# Patient Record
Sex: MALE | Race: BLACK OR AFRICAN AMERICAN | NOT HISPANIC OR LATINO | URBAN - METROPOLITAN AREA
[De-identification: names, ages, dates, MRNs, and addresses within clinical notes are randomized per-mention and may not be internally consistent; named-entity substitution may affect disease eponyms.]

---

## 2023-04-23 ENCOUNTER — INPATIENT (INPATIENT)
Facility: HOSPITAL | Age: 38
LOS: 23 days | Discharge: HOME | End: 2023-05-17
Attending: PSYCHIATRY & NEUROLOGY | Admitting: PSYCHIATRY & NEUROLOGY
Payer: MEDICAID

## 2023-04-23 VITALS
DIASTOLIC BLOOD PRESSURE: 80 MMHG | HEART RATE: 78 BPM | TEMPERATURE: 98 F | RESPIRATION RATE: 18 BRPM | SYSTOLIC BLOOD PRESSURE: 130 MMHG | OXYGEN SATURATION: 100 %

## 2023-04-23 DIAGNOSIS — F25.0 SCHIZOAFFECTIVE DISORDER, BIPOLAR TYPE: ICD-10-CM

## 2023-04-23 DIAGNOSIS — F12.10 CANNABIS ABUSE, UNCOMPLICATED: ICD-10-CM

## 2023-04-23 LAB
ALBUMIN SERPL ELPH-MCNC: 4.6 G/DL — SIGNIFICANT CHANGE UP (ref 3.3–5)
ALP SERPL-CCNC: 57 U/L — SIGNIFICANT CHANGE UP (ref 40–120)
ALT FLD-CCNC: 31 U/L — SIGNIFICANT CHANGE UP (ref 4–41)
AMPHET UR-MCNC: NEGATIVE — SIGNIFICANT CHANGE UP
ANION GAP SERPL CALC-SCNC: 11 MMOL/L — SIGNIFICANT CHANGE UP (ref 7–14)
APAP SERPL-MCNC: <10 UG/ML — LOW (ref 15–25)
APPEARANCE UR: CLEAR — SIGNIFICANT CHANGE UP
AST SERPL-CCNC: 17 U/L — SIGNIFICANT CHANGE UP (ref 4–40)
B PERT DNA SPEC QL NAA+PROBE: SIGNIFICANT CHANGE UP
B PERT+PARAPERT DNA PNL SPEC NAA+PROBE: SIGNIFICANT CHANGE UP
BARBITURATES UR SCN-MCNC: NEGATIVE — SIGNIFICANT CHANGE UP
BASOPHILS # BLD AUTO: 0.04 K/UL — SIGNIFICANT CHANGE UP (ref 0–0.2)
BASOPHILS NFR BLD AUTO: 0.8 % — SIGNIFICANT CHANGE UP (ref 0–2)
BENZODIAZ UR-MCNC: NEGATIVE — SIGNIFICANT CHANGE UP
BILIRUB SERPL-MCNC: 0.9 MG/DL — SIGNIFICANT CHANGE UP (ref 0.2–1.2)
BILIRUB UR-MCNC: NEGATIVE — SIGNIFICANT CHANGE UP
BORDETELLA PARAPERTUSSIS (RAPRVP): SIGNIFICANT CHANGE UP
BUN SERPL-MCNC: 11 MG/DL — SIGNIFICANT CHANGE UP (ref 7–23)
C PNEUM DNA SPEC QL NAA+PROBE: SIGNIFICANT CHANGE UP
CALCIUM SERPL-MCNC: 8.9 MG/DL — SIGNIFICANT CHANGE UP (ref 8.4–10.5)
CHLORIDE SERPL-SCNC: 103 MMOL/L — SIGNIFICANT CHANGE UP (ref 98–107)
CO2 SERPL-SCNC: 25 MMOL/L — SIGNIFICANT CHANGE UP (ref 22–31)
COCAINE METAB.OTHER UR-MCNC: NEGATIVE — SIGNIFICANT CHANGE UP
COLOR SPEC: COLORLESS — SIGNIFICANT CHANGE UP
CREAT SERPL-MCNC: 0.82 MG/DL — SIGNIFICANT CHANGE UP (ref 0.5–1.3)
CREATININE URINE RESULT, DAU: 23 MG/DL — SIGNIFICANT CHANGE UP
DIFF PNL FLD: NEGATIVE — SIGNIFICANT CHANGE UP
EGFR: 117 ML/MIN/1.73M2 — SIGNIFICANT CHANGE UP
EOSINOPHIL # BLD AUTO: 0.04 K/UL — SIGNIFICANT CHANGE UP (ref 0–0.5)
EOSINOPHIL NFR BLD AUTO: 0.8 % — SIGNIFICANT CHANGE UP (ref 0–6)
ETHANOL SERPL-MCNC: <10 MG/DL — SIGNIFICANT CHANGE UP
FLUAV SUBTYP SPEC NAA+PROBE: SIGNIFICANT CHANGE UP
FLUBV RNA SPEC QL NAA+PROBE: SIGNIFICANT CHANGE UP
GLUCOSE SERPL-MCNC: 99 MG/DL — SIGNIFICANT CHANGE UP (ref 70–99)
GLUCOSE UR QL: NEGATIVE — SIGNIFICANT CHANGE UP
HADV DNA SPEC QL NAA+PROBE: SIGNIFICANT CHANGE UP
HCOV 229E RNA SPEC QL NAA+PROBE: SIGNIFICANT CHANGE UP
HCOV HKU1 RNA SPEC QL NAA+PROBE: DETECTED
HCOV NL63 RNA SPEC QL NAA+PROBE: SIGNIFICANT CHANGE UP
HCOV OC43 RNA SPEC QL NAA+PROBE: SIGNIFICANT CHANGE UP
HCT VFR BLD CALC: 37.1 % — LOW (ref 39–50)
HGB BLD-MCNC: 12.1 G/DL — LOW (ref 13–17)
HMPV RNA SPEC QL NAA+PROBE: SIGNIFICANT CHANGE UP
HPIV1 RNA SPEC QL NAA+PROBE: SIGNIFICANT CHANGE UP
HPIV2 RNA SPEC QL NAA+PROBE: SIGNIFICANT CHANGE UP
HPIV3 RNA SPEC QL NAA+PROBE: SIGNIFICANT CHANGE UP
HPIV4 RNA SPEC QL NAA+PROBE: SIGNIFICANT CHANGE UP
IANC: 2.87 K/UL — SIGNIFICANT CHANGE UP (ref 1.8–7.4)
IMM GRANULOCYTES NFR BLD AUTO: 0 % — SIGNIFICANT CHANGE UP (ref 0–0.9)
KETONES UR-MCNC: NEGATIVE — SIGNIFICANT CHANGE UP
LEUKOCYTE ESTERASE UR-ACNC: NEGATIVE — SIGNIFICANT CHANGE UP
LYMPHOCYTES # BLD AUTO: 1.37 K/UL — SIGNIFICANT CHANGE UP (ref 1–3.3)
LYMPHOCYTES # BLD AUTO: 27.6 % — SIGNIFICANT CHANGE UP (ref 13–44)
M PNEUMO DNA SPEC QL NAA+PROBE: SIGNIFICANT CHANGE UP
MCHC RBC-ENTMCNC: 31.5 PG — SIGNIFICANT CHANGE UP (ref 27–34)
MCHC RBC-ENTMCNC: 32.6 GM/DL — SIGNIFICANT CHANGE UP (ref 32–36)
MCV RBC AUTO: 96.6 FL — SIGNIFICANT CHANGE UP (ref 80–100)
METHADONE UR-MCNC: NEGATIVE — SIGNIFICANT CHANGE UP
MONOCYTES # BLD AUTO: 0.65 K/UL — SIGNIFICANT CHANGE UP (ref 0–0.9)
MONOCYTES NFR BLD AUTO: 13.1 % — SIGNIFICANT CHANGE UP (ref 2–14)
NEUTROPHILS # BLD AUTO: 2.87 K/UL — SIGNIFICANT CHANGE UP (ref 1.8–7.4)
NEUTROPHILS NFR BLD AUTO: 57.7 % — SIGNIFICANT CHANGE UP (ref 43–77)
NITRITE UR-MCNC: NEGATIVE — SIGNIFICANT CHANGE UP
NRBC # BLD: 0 /100 WBCS — SIGNIFICANT CHANGE UP (ref 0–0)
NRBC # FLD: 0 K/UL — SIGNIFICANT CHANGE UP (ref 0–0)
OPIATES UR-MCNC: NEGATIVE — SIGNIFICANT CHANGE UP
OXYCODONE UR-MCNC: NEGATIVE — SIGNIFICANT CHANGE UP
PCP SPEC-MCNC: SIGNIFICANT CHANGE UP
PCP UR-MCNC: NEGATIVE — SIGNIFICANT CHANGE UP
PH UR: 7 — SIGNIFICANT CHANGE UP (ref 5–8)
PLATELET # BLD AUTO: 261 K/UL — SIGNIFICANT CHANGE UP (ref 150–400)
POTASSIUM SERPL-MCNC: 3.8 MMOL/L — SIGNIFICANT CHANGE UP (ref 3.5–5.3)
POTASSIUM SERPL-SCNC: 3.8 MMOL/L — SIGNIFICANT CHANGE UP (ref 3.5–5.3)
PROT SERPL-MCNC: 7.2 G/DL — SIGNIFICANT CHANGE UP (ref 6–8.3)
PROT UR-MCNC: NEGATIVE — SIGNIFICANT CHANGE UP
RAPID RVP RESULT: DETECTED
RBC # BLD: 3.84 M/UL — LOW (ref 4.2–5.8)
RBC # FLD: 12.7 % — SIGNIFICANT CHANGE UP (ref 10.3–14.5)
RSV RNA SPEC QL NAA+PROBE: SIGNIFICANT CHANGE UP
RV+EV RNA SPEC QL NAA+PROBE: SIGNIFICANT CHANGE UP
SALICYLATES SERPL-MCNC: <0.3 MG/DL — LOW (ref 15–30)
SARS-COV-2 RNA SPEC QL NAA+PROBE: SIGNIFICANT CHANGE UP
SODIUM SERPL-SCNC: 139 MMOL/L — SIGNIFICANT CHANGE UP (ref 135–145)
SP GR SPEC: 1.01 — LOW (ref 1.01–1.05)
THC UR QL: POSITIVE
TOXICOLOGY SCREEN, DRUGS OF ABUSE, SERUM RESULT: SIGNIFICANT CHANGE UP
TSH SERPL-MCNC: 1.6 UIU/ML — SIGNIFICANT CHANGE UP (ref 0.27–4.2)
UROBILINOGEN FLD QL: SIGNIFICANT CHANGE UP
WBC # BLD: 4.97 K/UL — SIGNIFICANT CHANGE UP (ref 3.8–10.5)
WBC # FLD AUTO: 4.97 K/UL — SIGNIFICANT CHANGE UP (ref 3.8–10.5)

## 2023-04-23 PROCEDURE — 99285 EMERGENCY DEPT VISIT HI MDM: CPT

## 2023-04-23 RX ORDER — RISPERIDONE 4 MG/1
2 TABLET ORAL AT BEDTIME
Refills: 0 | Status: DISCONTINUED | OUTPATIENT
Start: 2023-04-24 | End: 2023-05-01

## 2023-04-23 RX ORDER — HALOPERIDOL DECANOATE 100 MG/ML
5 INJECTION INTRAMUSCULAR ONCE
Refills: 0 | Status: DISCONTINUED | OUTPATIENT
Start: 2023-04-24 | End: 2023-05-17

## 2023-04-23 RX ORDER — RISPERIDONE 4 MG/1
2 TABLET ORAL AT BEDTIME
Refills: 0 | Status: DISCONTINUED | OUTPATIENT
Start: 2023-04-23 | End: 2023-04-24

## 2023-04-23 RX ORDER — HALOPERIDOL DECANOATE 100 MG/ML
5 INJECTION INTRAMUSCULAR ONCE
Refills: 0 | Status: COMPLETED | OUTPATIENT
Start: 2023-04-23 | End: 2023-04-23

## 2023-04-23 RX ORDER — HALOPERIDOL DECANOATE 100 MG/ML
5 INJECTION INTRAMUSCULAR EVERY 6 HOURS
Refills: 0 | Status: DISCONTINUED | OUTPATIENT
Start: 2023-04-24 | End: 2023-05-17

## 2023-04-23 RX ORDER — DIVALPROEX SODIUM 500 MG/1
250 TABLET, DELAYED RELEASE ORAL
Refills: 0 | Status: DISCONTINUED | OUTPATIENT
Start: 2023-04-23 | End: 2023-04-24

## 2023-04-23 RX ADMIN — HALOPERIDOL DECANOATE 5 MILLIGRAM(S): 100 INJECTION INTRAMUSCULAR at 14:59

## 2023-04-23 RX ADMIN — RISPERIDONE 2 MILLIGRAM(S): 4 TABLET ORAL at 22:09

## 2023-04-23 RX ADMIN — DIVALPROEX SODIUM 250 MILLIGRAM(S): 500 TABLET, DELAYED RELEASE ORAL at 18:50

## 2023-04-23 RX ADMIN — Medication 2 MILLIGRAM(S): at 14:59
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
## 2023-04-23 NOTE — ED BEHAVIORAL HEALTH NOTE - BEHAVIORAL HEALTH NOTE
COVID Exposure Screen- collateral (i.e. third-party, chart review, belongings, etc; include EMS and ED staff)  1.	*Has the patient had a COVID-19 test in the last 90 days?  (  x) Yes   (  ) No   (  ) Unknown- Reason: _____  IF YES PROCEED TO QUESTION #2. IF NO OR UNKNOWN, PLEASE SKIP TO QUESTION #3.  2.	Date of test(s) and result(s): _2 weeks ago was admitted to [facility] _______  3.	*Has the patient tested positive for COVID-19 antibodies? (  ) Yes   (  ) No   (  x) Unknown- Reason: _____  IF YES PROCEED TO QUESTION #4. IF NO or UNKNOWN, PLEASE SKIP TO QUESTION #5.  4.	Date of positive antibody test: ________  5.	*Has the patient received 2 doses of the COVID-19 vaccine? (  ) Yes   ( x ) No   (  ) Unknown- Reason: _____  IF YES PROCEED TO QUESTION #6. IF NO or UNKNOWN, PLEASE SKIP TO QUESTION #7.  6.	 Date of second dose: ________  7.	*In the past 10 days, has the patient been around anyone with a positive COVID-19 test?* (  ) Yes   (  x) No   (  ) Unknown- Reason: __  IF YES PROCEED TO QUESTION #8. IF NO or UNKNOWN, PLEASE SKIP TO QUESTION #13.  8.	Was the patient within 6 feet of them for at least 15 minutes? (  ) Yes   (  ) No   (  ) Unknown- Reason: _____  9.	Did the patient provide care for them? (  ) Yes   (  ) No   (  ) Unknown- Reason: ______  10.	Did the patient have direct physical contact with them (touched, hugged, or kissed them)? (  ) Yes   (  ) No    (  ) Unknown- Reason: __  11.	Did the patient share eating or drinking utensils with them? (  ) Yes   (  ) No    (  ) Unknown- Reason: ____  12.	Did they sneeze, cough, or somehow get respiratory droplets on the patient? (  ) Yes   (  ) No    (  ) Unknown- Reason: ______  13.	*Has the patient been out of [place] within the past 10 days?* (  ) Yes   ( x ) No   (  ) Unknown- Reason: _____  IF YES PLEASE ANSWER THE FOLLOWING QUESTIONS:  14.	Which state/country did they go to? ______  15.	Were they there over 24 hours? (  ) Yes   (  ) No    (  ) Unknown- Reason: ______  16.	Date of return to [facility]: ______

## 2023-04-23 NOTE — ED BEHAVIORAL HEALTH ASSESSMENT NOTE - NSBHATTESTAPPAMEND_PSY_A_CORE
I have personally seen and examined this patient. I fully participated in the care of this patient. I have made amendments to the documentation where appropriate and otherwise agree with the history, physical exam, and plan as documented by the JOSE
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
## 2023-04-23 NOTE — ED BEHAVIORAL HEALTH ASSESSMENT NOTE - DESCRIPTION
seasonal allergies hyperverbal with poor boundaries   ICU Vital Signs Last 24 Hrs  T(C): 36.7 (23 Apr 2023 12:49), Max: 36.9 (23 Apr 2023 11:03)  T(F): 98.1 (23 Apr 2023 12:49), Max: 98.4 (23 Apr 2023 11:03)  HR: 70 (23 Apr 2023 12:49) (70 - 78)  BP: 148/67 (23 Apr 2023 12:49) (130/80 - 148/67)  BP(mean): --  ABP: --  ABP(mean): --  RR: 18 (23 Apr 2023 12:49) (18 - 18)  SpO2: 100% (23 Apr 2023 12:49) (100% - 100%)    O2 Parameters below as of 23 Apr 2023 12:49  Patient On (Oxygen Delivery Method): room air was living with parents, [id number], single,

## 2023-04-23 NOTE — ED BEHAVIORAL HEALTH ASSESSMENT NOTE - RISK ASSESSMENT
high risk for aggression- psychosis, h/o aggression, compliant with treatment, substance abuse   low risk for suicide- no known suicide attempts, denies suicidal ideation, futuristic,

## 2023-04-23 NOTE — ED BEHAVIORAL HEALTH ASSESSMENT NOTE - PSYCHIATRIC ISSUES AND PLAN (INCLUDE STANDING AND PRN MEDICATION)
Start Risperdal 1 mg hs, Ativan 2 mg Haldol 5 mg po/im q6h prn agitation Start Risperdal 2 mg hs, Ativan 2 mg Haldol 5 mg po/im q6h prn agitation

## 2023-04-23 NOTE — ED BEHAVIORAL HEALTH ASSESSMENT NOTE - NSBHATTESTCOMMENTATTENDFT_PSY_A_CORE
Patient also seen by Attending: start risperdal 2mg PO bid and depakote 250mg PO bid for affective dysregulation, lability and impulsivity. Urine + for THC - daily THC use likely a factor in presentation but Marijuana use by itself (even with intoxication) cannot fully explain trajectory of symptoms, clinical presentation and collateral. Agree with above plan.
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
## 2023-04-23 NOTE — ED ADULT NURSE NOTE - OBJECTIVE STATEMENT
Received pt in  pt hyperverbal bought in for erratic behavior pt denies si/hi/avh presently, labs/covid collected eval on going.
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
## 2023-04-23 NOTE — ED BEHAVIORAL HEALTH ASSESSMENT NOTE - NSBHATTESTTYPEVISIT_PSY_A_CORE
On-site Attending with Resident/Fellow/Student and JOSE (99XXX codes) Attending evaluating patient with JOSE ([id number] code)
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
## 2023-04-23 NOTE — ED PROVIDER NOTE - PROGRESS NOTE DETAILS
ERIC Stanley: Pt received at sign out pending bed assignment; pt continuing to endorse neck pain requesting medications, appears agitated, Haldol and Ativan ordered.

## 2023-04-24 DIAGNOSIS — F25.0 SCHIZOAFFECTIVE DISORDER, BIPOLAR TYPE: ICD-10-CM

## 2023-04-24 PROCEDURE — 99214 OFFICE O/P EST MOD 30 MIN: CPT | Mod: GC

## 2023-04-24 RX ORDER — DIVALPROEX SODIUM 500 MG/1
500 TABLET, DELAYED RELEASE ORAL AT BEDTIME
Refills: 0 | Status: DISCONTINUED | OUTPATIENT
Start: 2023-04-24 | End: 2023-04-28

## 2023-04-24 RX ORDER — HALOPERIDOL DECANOATE 100 MG/ML
5 INJECTION INTRAMUSCULAR ONCE
Refills: 0 | Status: COMPLETED | OUTPATIENT
Start: 2023-04-24 | End: 2023-04-24

## 2023-04-24 RX ADMIN — Medication 2 MILLIGRAM(S): at 10:20

## 2023-04-24 RX ADMIN — DIVALPROEX SODIUM 500 MILLIGRAM(S): 500 TABLET, DELAYED RELEASE ORAL at 20:46

## 2023-04-24 RX ADMIN — RISPERIDONE 2 MILLIGRAM(S): 4 TABLET ORAL at 20:46

## 2023-04-24 RX ADMIN — HALOPERIDOL DECANOATE 5 MILLIGRAM(S): 100 INJECTION INTRAMUSCULAR at 10:20

## 2023-04-24 RX ADMIN — DIVALPROEX SODIUM 250 MILLIGRAM(S): 500 TABLET, DELAYED RELEASE ORAL at 07:34

## 2023-04-24 RX ADMIN — Medication 2 MILLIGRAM(S): at 20:46
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
## 2023-04-24 NOTE — BH PATIENT PROFILE - NSICDXPASTMEDICALHX_GEN_ALL_CORE_FT
[facility]    [address] [place] [address]    Phone:  [phone]    Fax:  [phone]       Thank You for choosing us for your health care visit. We are glad to serve you and happy to provide you with this summary of your visit. Please help us to ensure we have accurate records. If you find anything that needs to be changed, please let our staff know as soon as possible.          Your Demographic Information     Patient Name Sex Sujata Lehman Female 1950       Ethnic Group Patient Race    Not of  or  Origin White      Your Visit Details     Date & Time Provider Department    3/1/2017 9:00 AM T[facility] Haas [facility]      We Ordered or Performed the Following     THINPREP PAP TEST WITH HPV REGARDLESS       Conditions Discussed Today or Order-Related Diagnoses        Comments    Well adult exam    -  Primary     Cervical cancer screening           Your Vitals Were     BP Pulse Temp Resp Height Weight    108/66 (BP Location: [facility] [place], Patient Position: Sitting, Cuff Size: Regular) 76 98.5 °F (36.9 °C) (Tympanic) 10 5' 2\" (1.575 m) 109 lb 3.2 oz (49.5 kg)    BMI Smoking Status                19.97 kg/m2 Former Smoker          Medications Prescribed or Re-Ordered Today     None      Your Current Medications Are        Disp Refills Start End    potassium citrate SR 10 MEQ (1080 MG) Tab  tablet 4 3/21/2016     Sig - Route: Take 1 tablet by mouth 3 times daily (with meals). - Oral    Cholecalciferol (VITAMIN D3) 23062 UNITS Cap 6 tablet 3 3/21/2016     Sig - Route: Take 50,000 Units by mouth every 14 days. - Oral    Class: Eprescribe    triamcinolone (ARISTOCORT) 0.1 % cream 80 g 1 10/1/2014     Sig: Apply twice daily to itchy rash of shins until smooth and no longer itchy    Class: Eprescribe    Cosign for Ordering: Accepted by Kim Alicea MD on 10/1/2014  6:05 PM    tretinoin  (RETIN-A) 0.025 % cream 45 g 6 10/1/2014     Sig: Apply thin layer to affected areas of the face each nigh as directed-for cosmetic purposes    Class: Eprescribe    Notes to Pharmacy: FOR COSMETIC PURPOSES-DO NOT RUN THROUGH INSURANCE    Cosign for Ordering: Accepted by Kim Alicea MD on 10/1/2014  6:05 PM    mercaptopurine (PURINETHOL) 50 MG tablet        Sig - Route: Take 50 mg by mouth daily. - Oral    Class: Historical Med    cyanocobalamin 1000 MCG/ML injection        Sig - Route: Inject 1,000 mcg into the muscle once. Monthly - Intramuscular    Class: Historical Med    Multiple Vitamins-Minerals (MULTI FOR HER PO)        Sig - Route: Take  by mouth. - Oral    Class: Historical Med    Cholestyramine POWD        Class: Historical Med    Route: Does not apply    folic acid (FOLATE) 1 MG tablet        Sig - Route: Take 1 mg by mouth daily. - Oral    Class: Historical Med      Discontinued Medications        Reason for Discontinue    Calcium Carbonate-Vitamin D (CALCIUM 600+D PO) Patient Declined      Allergies     Unable To Obtain [Unknown] Other (See Comments)    hayfever      Immunizations History as of 3/1/2017     Name Date    HERPES ZOSTER SHINGLES 4/1/2016, 4/1/2015    Hepatitis A - Adult 12/31/2010    Influenza 11/18/2016    Td:Adult type tetanus/diphtheria 1/27/2003    Tdap 12/31/2010    Typhoid, Intramuscular 12/31/2010      Patient Portal Signup     Manage health care for you and your family anytime, anywhere with the new [facility], your free online resource for quick and easy access to personal health information, scheduling appointments, refilling prescriptions, viewing test results, paying bills and more.  Sign up for a free and secure account. Please follow the instructions below to securely complete your enrollment.     1. Go to [email or web address]  2. Click Sign Up Now   3. Enter the Activation Code when prompted     Activation Code: [email or web address]  Expires: 3/31/2017  1:15 PM    If you  have questions related to [facility], you can email [email or web address] or call [phone] to talk to our [facility] staff.  For questions related to your health, contact your physician’s office.  Please remember to dial 911 for medical emergencies.              Patient Instructions     None       PAST MEDICAL HISTORY:  No pertinent past medical history
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
## 2023-04-24 NOTE — ED ADULT NURSE REASSESSMENT NOTE - NS ED NURSE REASSESS COMMENT FT1
patient is agitated, walking around pacing. Mouthing off at officers. NP called, medication will be given as ordered.
Multiple attempts made to contact 8 uris at [phone] to give  unable to get in touch with unit. AND contacted she verbalized she will have the unit call for report eval on going.
Pt resting comfortably in bed, respirations are even and unlabored, vitals as charted. Pt calm and cooperative, awaiting bed assignment
Break RN: Pt awake, calm. Pt refusing morning medication. Educated on importance of medications, continued to refuse. Vitals as noted. Respirations even and unlabored, no accessory muscle use. Awaiting psychiatric bed.
Pt resting comfortably in bed, respirations are even and unlabored. Pt endorsing no complaints at this time. Calm and cooperative. Awaiting bed assignment

## 2023-04-24 NOTE — BH PATIENT PROFILE - NSBHMOOD_PSY_A_CORE
Central Prior Authorization Team   Phone: [phone]                     none required/? irritable

## 2023-04-25 DIAGNOSIS — F43.10 POST-TRAUMATIC STRESS DISORDER, UNSPECIFIED: ICD-10-CM

## 2023-04-25 LAB
A1C WITH ESTIMATED AVERAGE GLUCOSE RESULT: 5.4 % — SIGNIFICANT CHANGE UP (ref 4–5.6)
CHOLEST SERPL-MCNC: 160 MG/DL — SIGNIFICANT CHANGE UP
ESTIMATED AVERAGE GLUCOSE: 108 — SIGNIFICANT CHANGE UP
HDLC SERPL-MCNC: 100 MG/DL — SIGNIFICANT CHANGE UP
LIPID PNL WITH DIRECT LDL SERPL: 54 MG/DL — SIGNIFICANT CHANGE UP
NON HDL CHOLESTEROL: 60 MG/DL — SIGNIFICANT CHANGE UP
TRIGL SERPL-MCNC: 32 MG/DL — SIGNIFICANT CHANGE UP

## 2023-04-25 PROCEDURE — 93010 ELECTROCARDIOGRAM REPORT: CPT

## 2023-04-25 PROCEDURE — 99222 1ST HOSP IP/OBS MODERATE 55: CPT

## 2023-04-25 RX ORDER — DIPHENHYDRAMINE HCL 50 MG
50 CAPSULE ORAL ONCE
Refills: 0 | Status: COMPLETED | OUTPATIENT
Start: 2023-04-25 | End: 2023-04-25

## 2023-04-25 RX ORDER — NICOTINE POLACRILEX 2 MG
2 GUM BUCCAL
Refills: 0 | Status: DISCONTINUED | OUTPATIENT
Start: 2023-04-25 | End: 2023-05-17

## 2023-04-25 RX ORDER — ACETAMINOPHEN 500 MG
650 TABLET ORAL EVERY 6 HOURS
Refills: 0 | Status: DISCONTINUED | OUTPATIENT
Start: 2023-04-25 | End: 2023-05-17

## 2023-04-25 RX ADMIN — RISPERIDONE 2 MILLIGRAM(S): 4 TABLET ORAL at 22:22

## 2023-04-25 RX ADMIN — Medication 650 MILLIGRAM(S): at 18:34

## 2023-04-25 RX ADMIN — DIVALPROEX SODIUM 500 MILLIGRAM(S): 500 TABLET, DELAYED RELEASE ORAL at 22:22

## 2023-04-25 RX ADMIN — Medication 50 MILLIGRAM(S): at 22:22

## 2023-04-25 RX ADMIN — Medication 650 MILLIGRAM(S): at 11:52

## 2023-04-25 NOTE — BH SOCIAL WORK INITIAL PSYCHOSOCIAL EVALUATION - OTHER PAST PSYCHIATRIC HISTORY (INCLUDE DETAILS REGARDING ONSET, COURSE OF ILLNESS, INPATIENT/OUTPATIENT TREATMENT)
Pt is a 37 yo single male living with his family, currently unemployed and applied for SSD, with history of schizoaffective vs. bipolar disorder with psychosis, with multiple psychiatric hospitalizations, history of medication noncompliance, history of physical aggression when psychotic, cannabis abuse and history of alcohol abuse brought to the ED by EMS activated by parents for bizarre behavior in the context of medication noncompliance. Pt has been on Risperdal Consta when hospitalized previously but becomes noncompliant with treatment following discharge.

## 2023-04-25 NOTE — BH INPATIENT PSYCHIATRY ASSESSMENT NOTE - VIOLENCE RISK FACTORS:
Affective dysregulation/Impulsivity/Lack of insight into violence risk/need for treatment/Noncompliance with treatment/Irritability

## 2023-04-25 NOTE — BH INPATIENT PSYCHIATRY ASSESSMENT NOTE - NSBHCHARTREVIEWVS_PSY_A_CORE FT
Vital Signs Last 24 Hrs  T(C): 37 (04-25-23 @ 08:24), Max: 37 (04-25-23 @ 08:24)  T(F): 98.6 (04-25-23 @ 08:24), Max: 98.6 (04-25-23 @ 08:24)  HR: --  BP: --  BP(mean): --  RR: --  SpO2: --    Orthostatic VS  04-25-23 @ 08:24  Lying BP: --/-- HR: --  Sitting BP: 105/62 HR: --  Standing BP: 104/69 HR: --  Site: --  Mode: --  Orthostatic VS  04-24-23 @ 17:45  Lying BP: --/-- HR: --  Sitting BP: 113/74 HR: 93  Standing BP: 115/81 HR: 100  Site: --  Mode: --

## 2023-04-25 NOTE — BH INPATIENT PSYCHIATRY DISCHARGE NOTE - NSBHDCHANDOFFFT_PSY_ALL_CORE
Email with the option to call back Email handoff to [facility] at ext [phone] with the option to call back
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
## 2023-04-25 NOTE — BH INPATIENT PSYCHIATRY ASSESSMENT NOTE - MSE UNSTRUCTURED FT
On exam today the patient is calm and cooperative. Patient has good grooming and good hygiene.   Speech is clear and of normal rate.    Thought process: disorganized, illogical   Thought content: Possible paranoia and referential delusions  Perception: Denies auditory hallucinations or other perceptual disturbances  Mood: Describes as "good".   Affect: neutral, constricted, stable   Patient denies active suicidal ideation, intention and plan.    Patient denies active aggressive/homicidal ideation, intent or plan.   Patient is alert and oriented .   Fund of knowledge is fair. Memory is intact.  Insight and judgment are poor.   Impulse control is intact at this time.
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
## 2023-04-25 NOTE — BH INPATIENT PSYCHIATRY ASSESSMENT NOTE - NSBHATTESTCOMMENTATTENDFT_PSY_A_CORE
38 year old, male; domiciled with family (mom, dad, 2 brothers, 1 sister and brother-in-law); single (possible has a gf); no known dependents; unemployed on disability; PPH of schizoaffective vs bipolar with psychosis; multiple inpatient psychiatric hospitalizations (last discharged from [facility] ~mid-April, given Risperdal Consta); no known suicide attempts; h/o physically aggressive; active cannabis abuse and h/o ETOH abuse, no known history of complicated withdrawal; PMH childhood asthma; brought in by EMS; called by parents; for bizarre behavior and agitation in the context of noncompliance with treatment.     Patient on exam with thought disorganization and paranoid beliefs about family. With a long history of relapse secondary ti noncompliance with his medication and treatment regimen   Patient requires acute care  admitted on a 927 involuntary admission  Patient does not require CO and will follow with routine checks  Will plan for transition from risperidone oral and consta to paliperidone sustenna for longer acting GREEN

## 2023-04-25 NOTE — BH INPATIENT PSYCHIATRY ASSESSMENT NOTE - DESCRIPTION
Reports he lives with his mom, dad, 2 brothers, 1 sister and brother-in-law. Reports he just got section 8 housing and is planning on moving out. Reports he is unemployed but enjoys rap. Later, reports he doesn't like music anyone and prefers classical music. Reports he is on disability for his 'shell shock'.

## 2023-04-25 NOTE — BH INPATIENT PSYCHIATRY DISCHARGE NOTE - NSDCPROCEDURESFT_PSY_ALL_CORE
On 4/27/23, your prolactin was mildly elevated at 37.9 and on 5/2/23, your repeat prolactin continued to be mildly elevated at 45.7.

## 2023-04-25 NOTE — BH INPATIENT PSYCHIATRY ASSESSMENT NOTE - DETAILS
unclear - reports several loved ones passed away pushed father today- h/o aggression when noncompliant with medications . grandma with schizophrenia denies- per brother patient has not recently verbalized suicidal ideation.
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
## 2023-04-25 NOTE — BH TREATMENT PLAN - NSTXPSYCHOGOALOTHER_PSY_ALL_CORE
When pt is no longer an acute or imminent risk of harm to self or others, and is able to care for self safely, with a CGI- Improvement =2

## 2023-04-25 NOTE — BH INPATIENT PSYCHIATRY ASSESSMENT NOTE - NSBHMETABOLIC_PSY_ALL_CORE_FT
BMI: BMI (kg/m2): 18.1 (04-24-23 @ 17:45)  HbA1c: A1C with Estimated Average Glucose Result: 5.4 % (04-25-23 @ 08:21)    Glucose: POCT Blood Glucose.: 111 mg/dL (04-24-23 @ 10:25)    BP: 110/81 (04-24-23 @ 14:18) (105/70 - 148/67)  Lipid Panel: Date/Time: 04-25-23 @ 08:21  Cholesterol, Serum: 160  Direct LDL: --  HDL Cholesterol, Serum: 100  Total Cholesterol/HDL Ration Measurement: --  Triglycerides, Serum: 32

## 2023-04-25 NOTE — BH INPATIENT PSYCHIATRY ASSESSMENT NOTE - NSICDXBHTERTIARYDX_PSY_ALL_CORE
R/O Schizophrenia   F20.9  R/O Schizoaffective disorder, bipolar type   F25.0  R/O Psychoactive substance-induced psychosis   F19.952

## 2023-04-25 NOTE — PSYCHIATRIC REHAB INITIAL EVALUATION - NSBHALCSUBCHOICE_PSY_ALL_CORE
Patient reported daily use of cigarette. Per chart, patient has hx of being an active cannabis use and ETOH use.
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
## 2023-04-25 NOTE — BH INPATIENT PSYCHIATRY ASSESSMENT NOTE - CURRENT ACTIVE IDEATION
PRINCIPAL DISCHARGE DIAGNOSIS  Diagnosis: Ambulatory dysfunction  Assessment and Plan of Treatment: - take medications as rx  - follow up with pcp      SECONDARY DISCHARGE DIAGNOSES  Diagnosis: Frequent falls  Assessment and Plan of Treatment:
No

## 2023-04-25 NOTE — BH INPATIENT PSYCHIATRY ASSESSMENT NOTE - CURRENT MEDICATION
MEDICATIONS  (STANDING):  divalproex  milliGRAM(s) Oral at bedtime  risperiDONE   Tablet 2 milliGRAM(s) Oral at bedtime    MEDICATIONS  (PRN):  acetaminophen     Tablet .. 650 milliGRAM(s) Oral every 6 hours PRN Moderate Pain (4 - 6)  haloperidol     Tablet 5 milliGRAM(s) Oral every 6 hours PRN agitation  haloperidol    Injectable 5 milliGRAM(s) IntraMuscular Once PRN agitation  LORazepam     Tablet 2 milliGRAM(s) Oral every 6 hours PRN Agitation  LORazepam   Injectable 2 milliGRAM(s) IntraMuscular Once PRN Agitation  nicotine  Polacrilex Gum 2 milliGRAM(s) Oral every 2 hours PRN nicotine cravings

## 2023-04-25 NOTE — BH INPATIENT PSYCHIATRY DISCHARGE NOTE - NSBHDCMEDICALFT_PSY_A_CORE
Drug induced gynecomastia (after Invega Sustenna) - 4/27 Prolactin 37.9, 5/2 Prolactin 47.5 - Patient was given options for treatment because patient was not interested and reports the gynecomastia does not bother him

## 2023-04-25 NOTE — BH SOCIAL WORK INITIAL PSYCHOSOCIAL EVALUATION - NSCMSPTSTRENGTHS_PSY_ALL_CORE
Assertive/Courageous/Future/goal oriented/Leisure interest/Physically healthy/Resourceful/Supportive family

## 2023-04-25 NOTE — BH INPATIENT PSYCHIATRY ASSESSMENT NOTE - NSBHCHARTREVIEWLAB_PSY_A_CORE FT
THC, Urine Qualitative: Positive (04.23.23 @ 13:00) Comprehensive Metabolic Panel (04.23.23 @ 11:40)   Sodium, Serum: 139 mmol/L  Potassium, Serum: 3.8 mmol/L  Chloride, Serum: 103 mmol/L  Carbon Dioxide, Serum: 25 mmol/L  Anion Gap, Serum: 11 mmol/L  Blood Urea Nitrogen, Serum: 11 mg/dL  Creatinine, Serum: 0.82 mg/dL  Glucose, Serum: 99 mg/dL  Calcium, Total Serum: 8.9 mg/dL  Protein Total, Serum: 7.2 g/dL  Albumin, Serum: 4.6 g/dL  Bilirubin Total, Serum: 0.9 mg/dL  Alkaline Phosphatase, Serum: 57 U/L  Aspartate Aminotransferase (AST/SGOT): 17 U/L  Alanine Aminotransferase (ALT/SGPT): 31 U/L  eGFR: 117:WBC Count: 4.97 K/uL  RBC Count: 3.84 M/uL  Hemoglobin: 12.1 g/dL  Hematocrit: 37.1 %  Mean Cell Volume: 96.6 fL  Mean Cell Hemoglobin: 31.5 pg  Mean Cell Hemoglobin Conc: 32.6 gm/dL  Red Cell Distrib Width: 12.7 %  Platelet Count - Automated: 261 K/uL  Auto Neutrophil #: 2.87 K/uL  Auto Lymphocyte #: 1.37 K/uL  Auto Monocyte #: 0.65 K/uL  Auto Eosinophil #: 0.04 K/uL  Auto Basophil #: 0.04 K/uL  Auto Neutrophil %: 57.7:

## 2023-04-26 PROCEDURE — 99231 SBSQ HOSP IP/OBS SF/LOW 25: CPT

## 2023-04-26 RX ADMIN — Medication 650 MILLIGRAM(S): at 09:15

## 2023-04-26 RX ADMIN — Medication 650 MILLIGRAM(S): at 18:48

## 2023-04-26 RX ADMIN — DIVALPROEX SODIUM 500 MILLIGRAM(S): 500 TABLET, DELAYED RELEASE ORAL at 20:52

## 2023-04-26 RX ADMIN — Medication 650 MILLIGRAM(S): at 13:16

## 2023-04-26 RX ADMIN — RISPERIDONE 2 MILLIGRAM(S): 4 TABLET ORAL at 20:52

## 2023-04-26 NOTE — BH INPATIENT PSYCHIATRY PROGRESS NOTE - MSE UNSTRUCTURED FT
On exam today the patient is calm and cooperative. Patient has good grooming and good hygiene.   Speech is clear and of normal rate.    Thought process: disorganized, illogical   Thought content: Possible paranoia and referential delusions  Perception: Denies auditory hallucinations or other perceptual disturbances  Mood: Describes as "80% better".   Affect: neutral, constricted, stable   Patient denies active suicidal ideation, intention and plan.    Patient denies active aggressive/homicidal ideation, intent or plan.   Patient is alert and oriented .   Fund of knowledge is fair. Memory is poor.  Insight and judgment are poor.   Impulse control is intact at this time.
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
## 2023-04-26 NOTE — BH INPATIENT PSYCHIATRY PROGRESS NOTE - NSBHFUPINTERVALHXFT_PSY_A_CORE
Chart reviewed. Case discussed with interdisciplinary team. No acute events overnight. Patient compliant with standing medications. PRN PO benadryl 50mg last night for insomnia. Multiple PRN tylenol for neck pain.     Today, patient reports he's "50% - 68% - 80% better" with regards to his neck pain. Patient was holding a heat pack to his neck. Reports "I don't remember talking to you yesterday". Patient did not remember meeting with the medical team. Patient did not remember giving his brother's phone number. Reports he doesn't want the psychiatry team to talk to his family because "they are all liars". Denies paranoia. Denies delusions. Reports "I am autistic". Agreed to Invega Sustenna if it is covered by his insurance. Reports we can talk to a  at [employer] who helps him with his housing. Reports he doesn't have a gf.
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
## 2023-04-27 LAB — PROLACTIN SERPL-MCNC: 37.9 NG/ML — HIGH (ref 4.1–18.4)

## 2023-04-27 PROCEDURE — 99231 SBSQ HOSP IP/OBS SF/LOW 25: CPT

## 2023-04-27 RX ORDER — PALIPERIDONE 1.5 MG/1
234 TABLET, EXTENDED RELEASE ORAL ONCE
Refills: 0 | Status: COMPLETED | OUTPATIENT
Start: 2023-04-28 | End: 2023-04-28

## 2023-04-27 RX ORDER — PALIPERIDONE 1.5 MG/1
234 TABLET, EXTENDED RELEASE ORAL
Qty: 1 | Refills: 1
Start: 2023-04-27

## 2023-04-27 RX ADMIN — Medication 650 MILLIGRAM(S): at 20:44

## 2023-04-27 RX ADMIN — Medication 650 MILLIGRAM(S): at 21:30

## 2023-04-27 RX ADMIN — Medication 650 MILLIGRAM(S): at 10:16

## 2023-04-27 RX ADMIN — Medication 650 MILLIGRAM(S): at 11:18

## 2023-04-27 NOTE — BH INPATIENT PSYCHIATRY PROGRESS NOTE - MSE UNSTRUCTURED FT
On exam today the patient is calm and cooperative. Patient has good grooming and good hygiene.   Speech is clear and of normal rate.    Thought process: disorganized, illogical   Thought content: Possible paranoia and referential delusions  Perception: Denies auditory hallucinations or other perceptual disturbances  Mood: Describes as "better".   Affect: euthymic, constricted, stable   Patient denies active suicidal ideation, intention and plan.    Patient denies active aggressive/homicidal ideation, intent or plan.   Patient is alert and oriented .   Fund of knowledge is fair. Memory is poor.  Insight and judgment are poor.   Impulse control is intact at this time.
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
## 2023-04-28 PROCEDURE — 99231 SBSQ HOSP IP/OBS SF/LOW 25: CPT

## 2023-04-28 RX ORDER — DIVALPROEX SODIUM 500 MG/1
1000 TABLET, DELAYED RELEASE ORAL AT BEDTIME
Refills: 0 | Status: DISCONTINUED | OUTPATIENT
Start: 2023-04-28 | End: 2023-05-17

## 2023-04-28 RX ADMIN — Medication 650 MILLIGRAM(S): at 11:21

## 2023-04-28 RX ADMIN — PALIPERIDONE 234 MILLIGRAM(S): 1.5 TABLET, EXTENDED RELEASE ORAL at 08:50

## 2023-04-28 RX ADMIN — Medication 650 MILLIGRAM(S): at 17:39
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
## 2023-04-28 NOTE — BH INPATIENT PSYCHIATRY PROGRESS NOTE - NSBHFUPINTERVALHXFT_PSY_A_CORE
Patient seen for follow up of psychosis  Chart reviewed and case discussed with interdisciplinary team.   Patient was compliant with standing medications and GREEN today   More disorganized, paranoid and irritable today

## 2023-04-28 NOTE — BH INPATIENT PSYCHIATRY PROGRESS NOTE - MSE UNSTRUCTURED FT
On exam today the patient is irritable and disorganized.   Speech is loud and rapid and pressured today .    Thought process: disorganized, illogical   Thought content: Paranoid and referential delusions  Perception: Denies auditory hallucinations or other perceptual disturbances  Mood: Describes as "edgy".   Affect: irritable   Patient denies active suicidal ideation, intention and plan.    Patient denies active aggressive/homicidal ideation, intent or plan.   Patient is alert and oriented .   Fund of knowledge is fair. Memory is poor.  Insight and judgment are poor.   Impulse control is intact at this time.

## 2023-04-29 PROCEDURE — 99231 SBSQ HOSP IP/OBS SF/LOW 25: CPT

## 2023-04-29 RX ORDER — LORATADINE 10 MG/1
10 TABLET ORAL DAILY
Refills: 0 | Status: DISCONTINUED | OUTPATIENT
Start: 2023-04-29 | End: 2023-05-17

## 2023-04-29 RX ADMIN — LORATADINE 10 MILLIGRAM(S): 10 TABLET ORAL at 13:52

## 2023-04-29 NOTE — BH INPATIENT PSYCHIATRY PROGRESS NOTE - NSBHFUPINTERVALHXFT_PSY_A_CORE
Patient seen for follow up of psychosis  Chart reviewed and case discussed with interdisciplinary team.   Patient remains paranoid and irritable today on exam

## 2023-04-29 NOTE — BH INPATIENT PSYCHIATRY PROGRESS NOTE - MSE UNSTRUCTURED FT
On exam today the patient is irritable .   Speech is loud and rapid and pressured today .    Thought process: less disorganized, illogical   Thought content: Paranoid and referential delusions  Perception: Denies auditory hallucinations or other perceptual disturbances  Mood: Describes as "OK today".   Affect: irritable   Patient denies active suicidal ideation, intention and plan.    Patient denies active aggressive/homicidal ideation, intent or plan.   Patient is alert and oriented .   Fund of knowledge is fair. Memory is poor.  Insight and judgment are poor.   Impulse control is intact at this time.

## 2023-04-29 NOTE — BH INPATIENT PSYCHIATRY PROGRESS NOTE - NSBHASSESSSUMMFT_PSY_ALL_CORE
Patient is a 38 year old, male; domiciled with family; single; no known dependents; unemployed on disability; PPH of schizoaffective vs bipolar with psychosis; multiple inpatient psychiatric hospitalizations (last discharged from [facility] ~mid-April, given Risperdal Consta); no known suicide attempts; h/o physically aggressive; active cannabis abuse and h/o ETOH abuse, no known history of complicated withdrawal; PMH childhood asthma; brought in by EMS; called by parents; for bizarre behavior and agitation in the context of noncompliance with treatment.     Patient presents with symptoms consistent with psychosis in the setting of medication noncompliance, substance use (Utox + THC), and psychosocial stressors (family, housing). Patient deny active SI/HI. Patient does not appear acutely manic, intoxicated, or withdrawing from substances. Patient was disorganized with flat affect and possibly delusional and paranoid about his mom. Additionally, per chart, patient was agitated with disorganized behaviors at home and in ED. Patient requires involuntary inpatient psychiatric admission for safety, stabilization, and treatment.     Ddx: schizophrenia, substance-induced psychotic disorder, schizoaffective, PTSD    4/29  Patient less  disorganized, but still more irritable and more paranoid      Plan  1. Legal: Admitted on 9.27.   2. Safety: No reported SI/SIB/HI/VI currently on unit; continue routine observation.   -psychotropic PRN medications for safety: Haldol 5mg PO/IM q6h, Ativan 2 mg PO/IM q6h prn agitation/severe agitation  3. Psychiatric:   -c/w risperidone 2mg PO qhs  Paliperidone Sustenna 234 mg today 4/28    -c/w depakote 1000mg qhs  -c/w PRN 2mg nicotine gum q2h for nicotine cravings  4. Therapy: group & milieu therapy  5. Medical: neck pain  -c/w PRN tylenol q6h for neck pain  -c/w heat/ ice pack as requested for neck pain  -Start Ensure supplement  6. Collateral: Collateral from family  7. Disposition: When stable

## 2023-04-30 PROCEDURE — 99231 SBSQ HOSP IP/OBS SF/LOW 25: CPT

## 2023-04-30 RX ORDER — TRAZODONE HCL 50 MG
50 TABLET ORAL AT BEDTIME
Refills: 0 | Status: DISCONTINUED | OUTPATIENT
Start: 2023-04-30 | End: 2023-05-17

## 2023-04-30 RX ADMIN — RISPERIDONE 2 MILLIGRAM(S): 4 TABLET ORAL at 20:32

## 2023-04-30 RX ADMIN — Medication 650 MILLIGRAM(S): at 06:12

## 2023-04-30 RX ADMIN — Medication 50 MILLIGRAM(S): at 21:55

## 2023-04-30 RX ADMIN — Medication 650 MILLIGRAM(S): at 07:04

## 2023-04-30 RX ADMIN — DIVALPROEX SODIUM 1000 MILLIGRAM(S): 500 TABLET, DELAYED RELEASE ORAL at 20:32

## 2023-04-30 NOTE — BH INPATIENT PSYCHIATRY PROGRESS NOTE - NSBHFUPINTERVALHXFT_PSY_A_CORE
Patient seen for follow up of psychosis  Chart reviewed and case discussed with interdisciplinary team.   Patient remains paranoid and irritable today on exam  States he will start refusing medications after reading about them  Unable to tolerate any redirection and walks away

## 2023-04-30 NOTE — BH INPATIENT PSYCHIATRY PROGRESS NOTE - NSBHASSESSSUMMFT_PSY_ALL_CORE
Patient is a 38 year old, male; domiciled with family; single; no known dependents; unemployed on disability; PPH of schizoaffective vs bipolar with psychosis; multiple inpatient psychiatric hospitalizations (last discharged from [facility] ~mid-April, given Risperdal Consta); no known suicide attempts; h/o physically aggressive; active cannabis abuse and h/o ETOH abuse, no known history of complicated withdrawal; PMH childhood asthma; brought in by EMS; called by parents; for bizarre behavior and agitation in the context of noncompliance with treatment.     Patient presents with symptoms consistent with psychosis in the setting of medication noncompliance, substance use (Utox + THC), and psychosocial stressors (family, housing). Patient deny active SI/HI. Patient does not appear acutely manic, intoxicated, or withdrawing from substances. Patient was disorganized with flat affect and possibly delusional and paranoid about his mom. Additionally, per chart, patient was agitated with disorganized behaviors at home and in ED. Patient requires involuntary inpatient psychiatric admission for safety, stabilization, and treatment.     Ddx: schizophrenia, substance-induced psychotic disorder, schizoaffective, PTSD    4/30  Patient more disorganized, and more irritable and paranoid      Plan  1. Legal: Admitted on 9.27.   2. Safety: No reported SI/SIB/HI/VI currently on unit; continue routine observation.   -psychotropic PRN medications for safety: Haldol 5mg PO/IM q6h, Ativan 2 mg PO/IM q6h prn agitation/severe agitation  3. Psychiatric:   -c/w risperidone 2mg PO qhs  Paliperidone Sustenna 234 mg today 4/28    -c/w depakote 1000mg qhs  -c/w PRN 2mg nicotine gum q2h for nicotine cravings  4. Therapy: group & milieu therapy  5. Medical: neck pain  -c/w PRN tylenol q6h for neck pain  -c/w heat/ ice pack as requested for neck pain  -Start Ensure supplement  6. Collateral: Collateral from family  7. Disposition: When stable

## 2023-04-30 NOTE — BH INPATIENT PSYCHIATRY PROGRESS NOTE - MSE UNSTRUCTURED FT
On exam today the patient is irritable .   Speech is loud and rapid and pressured today .    Thought process: perseverative and disorganized, illogical   Thought content: Paranoid and referential delusions  Perception: Denies auditory hallucinations or other perceptual disturbances  Mood: Describes as "OK today".   Affect: irritable   Patient denies active suicidal ideation, intention and plan.    Patient denies active aggressive/homicidal ideation, intent or plan.   Patient is alert and oriented .   Fund of knowledge is fair. Memory is poor.  Insight and judgment are poor.   Impulse control is intact at this time.

## 2023-05-01 PROCEDURE — 99231 SBSQ HOSP IP/OBS SF/LOW 25: CPT

## 2023-05-01 RX ORDER — PALIPERIDONE 1.5 MG/1
156 TABLET, EXTENDED RELEASE ORAL ONCE
Refills: 0 | Status: COMPLETED | OUTPATIENT
Start: 2023-05-02 | End: 2023-05-02

## 2023-05-01 RX ADMIN — Medication 650 MILLIGRAM(S): at 22:56

## 2023-05-01 RX ADMIN — Medication 650 MILLIGRAM(S): at 21:50

## 2023-05-01 RX ADMIN — DIVALPROEX SODIUM 1000 MILLIGRAM(S): 500 TABLET, DELAYED RELEASE ORAL at 21:15

## 2023-05-01 NOTE — BH INPATIENT PSYCHIATRY PROGRESS NOTE - NSBHFUPINTERVALHXFT_PSY_A_CORE
Patient seen for follow up of psychosis. Chart reviewed and case discussed with interdisciplinary team. No acute events.   Today, patient reports feeling "heavenly" because the weather is nicer and requires 'photosynthesis'. Reports he continues to have joint pain (knees, fingers, back) due to the rain. Continues to report neck pain that worsens when he sneezes and passes gas. Reports sleeping well with trazodone 50mg qhs. Reports seeing a mouse and cockroach on the unit over the weekend. Refusing to make eye contact with the attending physician due to 'macho energy'. Reports his sister will drop off his reading glasses today.

## 2023-05-01 NOTE — BH INPATIENT PSYCHIATRY PROGRESS NOTE - NSBHATTESTCOMMENTATTENDFT_PSY_A_CORE
38 year old, male; domiciled with family (mom, dad, 2 brothers, 1 sister and brother-in-law); single (possible has a gf); no known dependents; unemployed on disability; PPH of schizoaffective vs bipolar with psychosis; multiple inpatient psychiatric hospitalizations (last discharged from [facility] ~mid-April, given Risperdal Consta); no known suicide attempts; h/o physically aggressive; active cannabis abuse and h/o ETOH abuse, no known history of complicated withdrawal; PMH childhood asthma; brought in by EMS; called by parents; for bizarre behavior and agitation in the context of noncompliance with treatment.     05/01  Clinical Update  Patient very  paranoid about me but able to talk and focus on resident  With somatic preoccupation also of delusional intensity.   Continuing with transition from risperidone oral and consta to paliperidone sustenna for longer acting GREEN 2 dose tomorrow

## 2023-05-01 NOTE — BH INPATIENT PSYCHIATRY PROGRESS NOTE - NSBHASSESSSUMMFT_PSY_ALL_CORE
Patient is a 38 year old, male; domiciled with family; single; no known dependents; unemployed on disability; PPH of schizoaffective vs bipolar with psychosis; multiple inpatient psychiatric hospitalizations (last discharged from [facility] ~mid-April, given Risperdal Consta); no known suicide attempts; h/o physically aggressive; active cannabis abuse and h/o ETOH abuse, no known history of complicated withdrawal; PMH childhood asthma; brought in by EMS; called by parents; for bizarre behavior and agitation in the context of noncompliance with treatment.     Patient presents with symptoms consistent with psychosis in the setting of medication noncompliance, substance use (Utox + THC), and psychosocial stressors (family, housing). Patient deny active SI/HI. Patient does not appear acutely manic, intoxicated, or withdrawing from substances. Patient was disorganized with flat affect and possibly delusional and paranoid about his mom. Additionally, per chart, patient was agitated with disorganized behaviors at home and in ED. Patient requires involuntary inpatient psychiatric admission for safety, stabilization, and treatment.     Ddx: schizophrenia, substance-induced psychotic disorder, schizoaffective, PTSD    Today, patient appeared more somatically preoccupied and irritable. Plan for Invega Sustenna 156mg 5/2/23.     Plan  1. Legal: Admitted on 9.27.   2. Safety: No reported SI/SIB/HI/VI currently on unit; continue routine observation.   -psychotropic PRN medications for safety: Haldol 5mg PO/IM q6h, Ativan 2 mg PO/IM q6h prn agitation/severe agitation  3. Psychiatric:   -d/c risperidone 2mg PO qhs  Paliperidone Sustenna 234 mg today 4/28   Plan for Paliperidone Sustenna 156mg 5/2/23   -c/w depakote 1000mg qhs  -c/w PRN 2mg nicotine gum q2h for nicotine cravings  4. Therapy: group & milieu therapy  5. Medical: neck pain  -c/w PRN tylenol q6h for neck pain  -c/w heat/ ice pack as requested for neck pain  -c/w Ensure supplement  6. Collateral: Collateral from family  7. Disposition: When stable

## 2023-05-01 NOTE — BH INPATIENT PSYCHIATRY PROGRESS NOTE - MSE UNSTRUCTURED FT
On exam today the patient is cooperative but still irritable  Speech is clear and normal rate.    Thought process: disorganized, illogical   Thought content: Paranoid and referential delusions. Somatically preoccupied.   Perception: Denies auditory hallucinations or other perceptual disturbances  Mood: Describes as "heavenly".   Affect: irritable, full, stable   Patient denies active suicidal ideation, intention and plan.    Patient denies active aggressive/homicidal ideation, intent or plan.   Patient is alert and oriented .   Fund of knowledge is fair. Memory is poor.  Insight and judgment are poor.   Impulse control is intact at this time.

## 2023-05-02 LAB — PROLACTIN SERPL-MCNC: 45.7 NG/ML — HIGH (ref 4.1–18.4)

## 2023-05-02 PROCEDURE — 99232 SBSQ HOSP IP/OBS MODERATE 35: CPT

## 2023-05-02 PROCEDURE — 90832 PSYTX W PT 30 MINUTES: CPT

## 2023-05-02 RX ADMIN — PALIPERIDONE 156 MILLIGRAM(S): 1.5 TABLET, EXTENDED RELEASE ORAL at 08:50

## 2023-05-02 RX ADMIN — Medication 650 MILLIGRAM(S): at 08:49

## 2023-05-02 RX ADMIN — HALOPERIDOL DECANOATE 5 MILLIGRAM(S): 100 INJECTION INTRAMUSCULAR at 08:50

## 2023-05-02 RX ADMIN — DIVALPROEX SODIUM 1000 MILLIGRAM(S): 500 TABLET, DELAYED RELEASE ORAL at 20:49

## 2023-05-02 RX ADMIN — Medication 650 MILLIGRAM(S): at 13:07

## 2023-05-02 NOTE — BH PSYCHOLOGY - CLINICIAN PSYCHOTHERAPY NOTE - TOKEN PULL-DIAGNOSIS
Primary Diagnosis:  Psychosis [F29]      Schizoaffective disorder, bipolar type [F25.0]        Problem Dx:   Post traumatic stress disorder (PTSD) [F43.10]      Cannabis abuse [F12.10]      Schizoaffective disorder, bipolar type [F25.0]

## 2023-05-02 NOTE — BH INPATIENT PSYCHIATRY PROGRESS NOTE - NSBHASSESSSUMMFT_PSY_ALL_CORE
Patient is a 38 year old, male; domiciled with family; single; no known dependents; unemployed on disability; PPH of schizoaffective vs bipolar with psychosis; multiple inpatient psychiatric hospitalizations (last discharged from [facility] ~mid-April, given Risperdal Consta); no known suicide attempts; h/o physically aggressive; active cannabis abuse and h/o ETOH abuse, no known history of complicated withdrawal; PMH childhood asthma; brought in by EMS; called by parents; for bizarre behavior and agitation in the context of noncompliance with treatment.     Patient presents with symptoms consistent with psychosis in the setting of medication noncompliance, substance use (Utox + THC), and psychosocial stressors (family, housing). Patient deny active SI/HI. Patient does not appear acutely manic, intoxicated, or withdrawing from substances. Patient was disorganized with flat affect and possibly delusional and paranoid about his mom. Additionally, per chart, patient was agitated with disorganized behaviors at home and in ED. Patient requires involuntary inpatient psychiatric admission for safety, stabilization, and treatment.     Ddx: schizophrenia, substance-induced psychotic disorder, schizoaffective, PTSD    Today, patient complaining of possible gynecomastia s/p Paliperidone sustenna 156mg today. Will repeat prolactin. If elevated, will consider to add cabergoline for hyperprolactinemia.     Plan  1. Legal: Admitted on 9.27.   2. Safety: No reported SI/SIB/HI/VI currently on unit; continue routine observation.   -psychotropic PRN medications for safety: Haldol 5mg PO q6h prn agitation  3. Psychiatric:   -d/c risperidone 2mg PO qhs  Paliperidone Sustenna 234 mg today 4/28   Paliperidone Sustenna 156mg 5/2/23   -c/w depakote 1000mg qhs  -c/w PRN 2mg nicotine gum q2h for nicotine cravings  4. Therapy: group & milieu therapy  5. Medical: neck pain  -c/w PRN tylenol q6h for neck pain  -c/w heat/ ice pack as requested for neck pain  -c/w Ensure supplement  4/27 Prolactin 37.9, repeat prolactin 5/2 -> consider cabergoline and/or hospitalist consult  6. Collateral: Collateral from family  7. Disposition: When stable

## 2023-05-02 NOTE — BH INPATIENT PSYCHIATRY PROGRESS NOTE - MSE UNSTRUCTURED FT
On exam today the patient is cooperative.  Speech is clear and normal rate.    Thought process: disorganized, illogical   Thought content: Paranoid and referential delusions. Somatically preoccupied.   Perception: Denies auditory hallucinations or other perceptual disturbances  Mood: Describes as "radames".   Affect: irritable, full, stable   Patient denies active suicidal ideation, intention and plan.    Patient denies active aggressive/homicidal ideation, intent or plan.   Patient is alert and oriented .   Fund of knowledge is fair. Memory is poor.  Insight and judgment are poor.   Impulse control is intact at this time.

## 2023-05-02 NOTE — BH PSYCHOLOGY - CLINICIAN PSYCHOTHERAPY NOTE - NSBHPSYCHOLINT_PSY_A_CORE
Dynamic issues addressed/Problem-solving techniques discussed/Reality testing/Supportive therapy/Treatment compliance encouraged

## 2023-05-02 NOTE — BH PSYCHOLOGY - CLINICIAN PSYCHOTHERAPY NOTE - NSBHPSYCHOLNARRATIVE_PSY_A_CORE FT
Patient presented passionate, energized, cooperative and engaged to what appeared to be with genuine effort. Hygiene and grooming were fair. Mood was labile and affect congruent. Thought process was a combination of tangential, circumstantial, concrete, and goal-directed. Speech was clear, coherent, and of normal pace and volume. No perceptual disturbances were reported or observed. He denied active suicidal or homicidal ideation, plan, or intent. Insight and judgment were grossly poor.     Patient and writer met for a 20-minute individual therapy session. Writer had great difficulty organizing and structuring the session due to patient's thought processes mentioned above. Writer provided supportive space for patient to discuss his experiences. Themes included the hardships he has gone through, his resilience and survival instincts, him wanting to be a part of (possible) kids' lives he has living in the world that he does not know about, relationship history with past partners, distrusting of white men in power positions, and not believing he needs to be in the hospital because he does not have a mental illness. Writer provided validation and normalization as needed. Writer utilized session to build rapport with patient.

## 2023-05-02 NOTE — BH INPATIENT PSYCHIATRY PROGRESS NOTE - NSBHATTESTCOMMENTATTENDFT_PSY_A_CORE
38 year old, male; domiciled with family (mom, dad, 2 brothers, 1 sister and brother-in-law); single (possible has a gf); no known dependents; unemployed on disability; PPH of schizoaffective vs bipolar with psychosis; multiple inpatient psychiatric hospitalizations (last discharged from [facility] ~mid-April, given Risperdal Consta); no known suicide attempts; h/o physically aggressive; active cannabis abuse and h/o ETOH abuse, no known history of complicated withdrawal; PMH childhood asthma; brought in by EMS; called by parents; for bizarre behavior and agitation in the context of noncompliance with treatment.     05/02  Clinical Update  Patient  remains  paranoid about me but able to talk and focus on resident  With somatic preoccupation also of delusional intensity but also with legitimate complaint of nipple pain which may be related to prolactin effect from paliperidone   May need to transition to aripiprazole for lower prolactin burden

## 2023-05-03 RX ORDER — DIPHENHYDRAMINE HCL 50 MG
50 CAPSULE ORAL EVERY 6 HOURS
Refills: 0 | Status: DISCONTINUED | OUTPATIENT
Start: 2023-05-03 | End: 2023-05-17

## 2023-05-03 RX ADMIN — DIVALPROEX SODIUM 1000 MILLIGRAM(S): 500 TABLET, DELAYED RELEASE ORAL at 20:43

## 2023-05-03 RX ADMIN — Medication 2 MILLIGRAM(S): at 08:42

## 2023-05-03 RX ADMIN — HALOPERIDOL DECANOATE 5 MILLIGRAM(S): 100 INJECTION INTRAMUSCULAR at 08:43

## 2023-05-03 RX ADMIN — Medication 50 MILLIGRAM(S): at 08:43

## 2023-05-03 NOTE — BH INPATIENT PSYCHIATRY PROGRESS NOTE - NSBHFUPINTERVALHXFT_PSY_A_CORE
Patient seen for follow up of psychosis. Chart reviewed and case discussed with interdisciplinary team. No acute events. Repeat 5/2 prolactin 45.7.   Today, patient reports he does not want to switch his Invega Sustenna to Abilify despite his drug-induced gynecomastia and elevated prolactin. Reports the gynecomastia doesn't bother him. Patient was informed of the effect of elevated prolactin and patient continues to refuse oral medication to decrease his prolactin. Reports he does not want to take another medication and eventually hopes to go 'cold turkey' and not take any medications in the future (in 10 years). Also reports that he 'cut things off with his mother' and request to go to a shelter.

## 2023-05-03 NOTE — BH INPATIENT PSYCHIATRY PROGRESS NOTE - NSBHATTESTCOMMENTATTENDFT_PSY_A_CORE
38 year old, male; domiciled with family (mom, dad, 2 brothers, 1 sister and brother-in-law); single (possible has a gf); no known dependents; unemployed on disability; PPH of schizoaffective vs bipolar with psychosis; multiple inpatient psychiatric hospitalizations (last discharged from [facility] ~mid-April, given Risperdal Consta); no known suicide attempts; h/o physically aggressive; active cannabis abuse and h/o ETOH abuse, no known history of complicated withdrawal; PMH childhood asthma; brought in by EMS; called by parents; for bizarre behavior and agitation in the context of noncompliance with treatment.     05/03  Clinical Update  Patient remains  paranoid about me but able to talk and focus on resident  Unwilling to consider aripiprazole for breast tissue swelling and  nipple pain which are likely related to prolactin effect from paliperidone   Focused on discharge to a shelter

## 2023-05-03 NOTE — BH INPATIENT PSYCHIATRY PROGRESS NOTE - NSBHASSESSSUMMFT_PSY_ALL_CORE
Patient is a 38 year old, male; domiciled with family; single; no known dependents; unemployed on disability; PPH of schizoaffective vs bipolar with psychosis; multiple inpatient psychiatric hospitalizations (last discharged from [facility] ~mid-April, given Risperdal Consta); no known suicide attempts; h/o physically aggressive; active cannabis abuse and h/o ETOH abuse, no known history of complicated withdrawal; PMH childhood asthma; brought in by EMS; called by parents; for bizarre behavior and agitation in the context of noncompliance with treatment.     Patient presents with symptoms consistent with psychosis in the setting of medication noncompliance, substance use (Utox + THC), and psychosocial stressors (family, housing). Patient deny active SI/HI. Patient does not appear acutely manic, intoxicated, or withdrawing from substances. Patient was disorganized with flat affect and possibly delusional and paranoid about his mom. Additionally, per chart, patient was agitated with disorganized behaviors at home and in ED. Patient requires involuntary inpatient psychiatric admission for safety, stabilization, and treatment.     Ddx: schizophrenia, substance-induced psychotic disorder, schizoaffective, PTSD    Today, patient refuses to change his Invega Sustenna despite his drug-induced gynecomastia and elevated prolactin. Requesting to go to a shelter.      Plan  1. Legal: Admitted on 9.27.   2. Safety: No reported SI/SIB/HI/VI currently on unit; continue routine observation.   -psychotropic PRN medications for safety: Haldol 5mg PO q6h prn agitation  3. Psychiatric:   -d/c risperidone 2mg PO qhs  Paliperidone Sustenna 234 mg today 4/28   Paliperidone Sustenna 156mg 5/2/23   -c/w depakote 1000mg qhs  -c/w PRN 2mg nicotine gum q2h for nicotine cravings  4. Therapy: group & milieu therapy  5. Medical: neck pain  -c/w PRN tylenol q6h for neck pain  -c/w heat/ ice pack as requested for neck pain  -c/w Ensure supplement  4/27 Prolactin 37.9, repeat prolactin 5/2 45.7  6. Collateral: Collateral from family  7. Disposition: When stable

## 2023-05-03 NOTE — BH INPATIENT PSYCHIATRY PROGRESS NOTE - MSE UNSTRUCTURED FT
On exam today the patient is agitated and mildly cooperative.  Speech is clear and normal rate.    Thought process: disorganized, illogical   Thought content: Paranoid and referential delusions. Somatically preoccupied.   Perception: Denies auditory hallucinations or other perceptual disturbances  Mood: Describes as "great".   Affect: irritable, full, stable   Patient denies active suicidal ideation, intention and plan.    Patient denies active aggressive/homicidal ideation, intent or plan.   Patient is alert and oriented .   Fund of knowledge is fair. Memory is poor.  Insight and judgment are poor.   Impulse control is intact at this time.

## 2023-05-04 PROCEDURE — 99231 SBSQ HOSP IP/OBS SF/LOW 25: CPT

## 2023-05-04 PROCEDURE — 90853 GROUP PSYCHOTHERAPY: CPT

## 2023-05-04 RX ADMIN — Medication 2 MILLIGRAM(S): at 18:05

## 2023-05-04 RX ADMIN — Medication 650 MILLIGRAM(S): at 18:00

## 2023-05-04 RX ADMIN — DIVALPROEX SODIUM 1000 MILLIGRAM(S): 500 TABLET, DELAYED RELEASE ORAL at 22:39

## 2023-05-04 RX ADMIN — Medication 650 MILLIGRAM(S): at 18:30

## 2023-05-04 NOTE — BH INPATIENT PSYCHIATRY PROGRESS NOTE - NSBHATTESTCOMMENTATTENDFT_PSY_A_CORE
38 year old, male; domiciled with family (mom, dad, 2 brothers, 1 sister and brother-in-law); single (possible has a gf); no known dependents; unemployed on disability; PPH of schizoaffective vs bipolar with psychosis; multiple inpatient psychiatric hospitalizations (last discharged from [facility] ~mid-April, given Risperdal Consta); no known suicide attempts; h/o physically aggressive; active cannabis abuse and h/o ETOH abuse, no known history of complicated withdrawal; PMH childhood asthma; brought in by EMS; called by parents; for bizarre behavior and agitation in the context of noncompliance with treatment.     05/03  Clinical Update  Patient remains  paranoid about me but able to talk and focus on resident  Unwilling to consider aripiprazole for breast tissue swelling and  nipple pain which are likely related to prolactin effect from paliperidone   Focused on discharge to a shelter 38 year old, male; domiciled with family (mom, dad, 2 brothers, 1 sister and brother-in-law); single (possible has a gf); no known dependents; unemployed on disability; PPH of schizoaffective vs bipolar with psychosis; multiple inpatient psychiatric hospitalizations (last discharged from [facility] ~mid-April, given Risperdal Consta); no known suicide attempts; h/o physically aggressive; active cannabis abuse and h/o ETOH abuse, no known history of complicated withdrawal; PMH childhood asthma; brought in by EMS; called by parents; for bizarre behavior and agitation in the context of noncompliance with treatment.     05/04  Clinical Update  Patient remains  paranoid and disorganized in his thought process  Unwilling to consider aripiprazole or cabergoline    Focused on discharge and suing his father

## 2023-05-04 NOTE — BH INPATIENT PSYCHIATRY PROGRESS NOTE - NSBHASSESSSUMMFT_PSY_ALL_CORE
Patient is a 38 year old, male; domiciled with family; single; no known dependents; unemployed on disability; PPH of schizoaffective vs bipolar with psychosis; multiple inpatient psychiatric hospitalizations (last discharged from [facility] ~mid-April, given Risperdal Consta); no known suicide attempts; h/o physically aggressive; active cannabis abuse and h/o ETOH abuse, no known history of complicated withdrawal; PMH childhood asthma; brought in by EMS; called by parents; for bizarre behavior and agitation in the context of noncompliance with treatment.     Patient presents with symptoms consistent with psychosis in the setting of medication noncompliance, substance use (Utox + THC), and psychosocial stressors (family, housing). Patient deny active SI/HI. Patient does not appear acutely manic, intoxicated, or withdrawing from substances. Patient was disorganized with flat affect and possibly delusional and paranoid about his mom. Additionally, per chart, patient was agitated with disorganized behaviors at home and in ED. Patient requires involuntary inpatient psychiatric admission for safety, stabilization, and treatment.     Ddx: schizophrenia, substance-induced psychotic disorder, schizoaffective, PTSD    5/04  Today, refuses any medication changes despite experiencing gynecomastia and elevated prolactin on the current medication. Continues to request an x-ray for his neck and doesn't seem to understand why it is not indicated. Pt does not want to go home and wants to go to a shelter. D/c planning has begun.     Plan  1. Legal: Admitted on 9.27.   2. Safety: No reported SI/SIB/HI/VI currently on unit; continue routine observation.   -psychotropic PRN medications for safety: Haldol 5mg PO q6h prn agitation  3. Psychiatric:   -d/c risperidone 2mg PO qhs  Paliperidone Sustenna 234 mg today 4/28   Paliperidone Sustenna 156mg 5/2/23   -c/w depakote 1000mg qhs  -c/w PRN 2mg nicotine gum q2h for nicotine cravings  4. Therapy: group & milieu therapy  5. Medical: neck pain  -c/w PRN tylenol q6h for neck pain  -c/w heat/ ice pack as requested for neck pain  -c/w Ensure supplement  4/27 Prolactin 37.9, repeat prolactin 5/2 45.7  6. Collateral: Collateral from family  7. Disposition: When stable

## 2023-05-04 NOTE — BH INPATIENT PSYCHIATRY PROGRESS NOTE - NSBHFUPINTERVALHXFT_PSY_A_CORE
Patient seen for follow up of psychosis. Chart reviewed and case discussed with interdisciplinary team.   No acute events.  Today, patient reports he does not want to switch his Invega Sustenna to Abilify despite his drug-induced gynecomastia and elevated prolactin. Reports he does not want to take another medication because each medication has side effects and wants to stick with the one he is on. Continues to request an x-ray for his neck and makes a comment about his bones being different from other people's because they don't break. Pt does not want to go home and wants to go to a shelter.

## 2023-05-05 PROCEDURE — 99231 SBSQ HOSP IP/OBS SF/LOW 25: CPT | Mod: GC

## 2023-05-05 RX ADMIN — Medication 650 MILLIGRAM(S): at 21:03

## 2023-05-05 RX ADMIN — Medication 650 MILLIGRAM(S): at 22:01

## 2023-05-05 RX ADMIN — DIVALPROEX SODIUM 1000 MILLIGRAM(S): 500 TABLET, DELAYED RELEASE ORAL at 20:36

## 2023-05-05 NOTE — BH INPATIENT PSYCHIATRY PROGRESS NOTE - NSBHATTESTCOMMENTATTENDFT_PSY_A_CORE
On exam today patient seems activated by multiple psych emergencies that have occurred on the unit. He is paranoid towards SW that is trying to have him sign consent for housing referral and still paranoid towards his family. MSE notable for irritable intense affect and rapid to pressured speech. Denies AVH, SI/HI. Denies physical complaints and tolerating meds after initiation of Invega sustenna GREEN.

## 2023-05-05 NOTE — BH INPATIENT PSYCHIATRY PROGRESS NOTE - MSE UNSTRUCTURED FT
On exam today the patient is calm and cooperative.  Speech is clear and normal rate.    Thought process: More linear  Thought content: Continues to be somatically preoccupied.   Perception: Denies auditory hallucinations or other perceptual disturbances  Mood: Describes as "great".   Affect: euthymic, full, stable   Patient denies active suicidal ideation, intention and plan.    Patient denies active aggressive/homicidal ideation, intent or plan.   Patient is alert and oriented .   Fund of knowledge is fair. Memory is poor.  Insight and judgment are poor.   Impulse control is intact at this time.   On exam today the patient is calm and cooperative.  Speech is clear and rapidrate.    Thought process: More linear  Thought content: Continues to be somatically preoccupied, paranoid  Perception: Denies auditory hallucinations or other perceptual disturbances  Mood: Describes as "great".   Affect: euthymic, full, stable, intense  Patient denies passive/active suicidal ideation, intention and plan.    Patient denies  aggressive/homicidal ideation, intent or plan.   Patient is alert and oriented .   Fund of knowledge is fair. Memory is poor.  Insight and judgment are poor.   Impulse control is intact at this time.

## 2023-05-05 NOTE — BH INPATIENT PSYCHIATRY PROGRESS NOTE - NSBHASSESSSUMMFT_PSY_ALL_CORE
Patient is a 38 year old, male; domiciled with family; single; no known dependents; unemployed on disability; PPH of schizoaffective vs bipolar with psychosis; multiple inpatient psychiatric hospitalizations (last discharged from [facility] ~mid-April, given Risperdal Consta); no known suicide attempts; h/o physically aggressive; active cannabis abuse and h/o ETOH abuse, no known history of complicated withdrawal; PMH childhood asthma; brought in by EMS; called by parents; for bizarre behavior and agitation in the context of noncompliance with treatment.     Patient presents with symptoms consistent with psychosis in the setting of medication noncompliance, substance use (Utox + THC), and psychosocial stressors (family, housing). Patient deny active SI/HI. Patient does not appear acutely manic, intoxicated, or withdrawing from substances. Patient was disorganized with flat affect and possibly delusional and paranoid about his mom. Additionally, per chart, patient was agitated with disorganized behaviors at home and in ED. Patient requires involuntary inpatient psychiatric admission for safety, stabilization, and treatment.     Ddx: schizophrenia, substance-induced psychotic disorder, schizoaffective, PTSD    Today, patient was calm and cooperative and able to engage in discussion about coping with stressful interactions with in the future. Dispo planning.     Plan  1. Legal: Admitted on 9.27.   2. Safety: No reported SI/SIB/HI/VI currently on unit; continue routine observation.   -psychotropic PRN medications for safety: Haldol 5mg PO q6h prn agitation  3. Psychiatric:   -d/c risperidone 2mg PO qhs  Paliperidone Sustenna 234 mg today 4/28   Paliperidone Sustenna 156mg 5/2/23   -c/w depakote 1000mg qhs  -c/w PRN 2mg nicotine gum q2h for nicotine cravings  4. Therapy: group & milieu therapy  5. Medical: neck pain  -c/w PRN tylenol q6h for neck pain  -c/w heat/ ice pack as requested for neck pain  -c/w Ensure supplement  4/27 Prolactin 37.9, repeat prolactin 5/2 45.7 - patient refused medications (cabergoline, abilify) to decrease prolactin.  6. Collateral: Collateral from family  7. Disposition: When stable       Patient is a 38 year old, male; domiciled with family; single; no known dependents; unemployed on disability; PPH of schizoaffective vs bipolar with psychosis; multiple inpatient psychiatric hospitalizations (last discharged from [facility] ~mid-April, given Risperdal Consta); no known suicide attempts; h/o physically aggressive; active cannabis abuse and h/o ETOH abuse, no known history of complicated withdrawal; PMH childhood asthma; brought in by EMS; called by parents; for bizarre behavior and agitation in the context of noncompliance with treatment.     Patient presents with symptoms consistent with psychosis in the setting of medication noncompliance, substance use (Utox + THC), and psychosocial stressors (family, housing). Patient deny active SI/HI. Patient does not appear acutely manic, intoxicated, or withdrawing from substances. Patient was disorganized with flat affect and possibly delusional and paranoid about his mom. Additionally, per chart, patient was agitated with disorganized behaviors at home and in ED. Patient requires involuntary inpatient psychiatric admission for safety, stabilization, and treatment.     Ddx: schizophrenia, substance-induced psychotic disorder, schizoaffective, PTSD    Today, patient was calm and cooperative and able to engage in discussion about coping with stressful interactions with in the future. Later pt presents as rapid, increasingly paranoid with intense affect. Dispo planning.     Plan  1. Legal: Admitted on 9.27.   2. Safety: No reported SI/SIB/HI/VI currently on unit; continue routine observation.   -psychotropic PRN medications for safety: Haldol 5mg PO q6h prn agitation  3. Psychiatric:   -d/c risperidone 2mg PO qhs-2 loading doses of Invega Sustenna completed  Paliperidone Sustenna 234 mg 4/28   Paliperidone Sustenna 156mg 5/2/23   -c/w depakote 1000mg qhs for mood staabilization  -c/w PRN 2mg nicotine gum q2h for nicotine cravings  4. Therapy: group & milieu therapy  5. Medical: neck pain  -c/w PRN tylenol q6h for neck pain  -c/w heat/ ice pack as requested for neck pain  -c/w Ensure supplement  4/27 Prolactin 37.9, repeat prolactin 5/2 45.7 - patient refused medications (cabergoline, abilify) to decrease prolactin.  6. Collateral: Collateral from family  7. Disposition: When stable
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
## 2023-05-06 RX ADMIN — HALOPERIDOL DECANOATE 5 MILLIGRAM(S): 100 INJECTION INTRAMUSCULAR at 00:22

## 2023-05-06 RX ADMIN — DIVALPROEX SODIUM 1000 MILLIGRAM(S): 500 TABLET, DELAYED RELEASE ORAL at 21:18

## 2023-05-06 RX ADMIN — Medication 50 MILLIGRAM(S): at 00:22

## 2023-05-06 RX ADMIN — Medication 2 MILLIGRAM(S): at 00:22

## 2023-05-07 RX ADMIN — Medication 650 MILLIGRAM(S): at 18:19

## 2023-05-07 RX ADMIN — DIVALPROEX SODIUM 1000 MILLIGRAM(S): 500 TABLET, DELAYED RELEASE ORAL at 20:16

## 2023-05-08 PROCEDURE — 99231 SBSQ HOSP IP/OBS SF/LOW 25: CPT

## 2023-05-08 RX ADMIN — DIVALPROEX SODIUM 1000 MILLIGRAM(S): 500 TABLET, DELAYED RELEASE ORAL at 20:59

## 2023-05-08 NOTE — BH INPATIENT PSYCHIATRY PROGRESS NOTE - NSBHASSESSSUMMFT_PSY_ALL_CORE
Patient is a 38 year old, male; domiciled with family; single; no known dependents; unemployed on disability; PPH of schizoaffective vs bipolar with psychosis; multiple inpatient psychiatric hospitalizations (last discharged from [facility] ~mid-April, given Risperdal Consta); no known suicide attempts; h/o physically aggressive; active cannabis abuse and h/o ETOH abuse, no known history of complicated withdrawal; PMH childhood asthma; brought in by EMS; called by parents; for bizarre behavior and agitation in the context of noncompliance with treatment.     Patient presents with symptoms consistent with psychosis in the setting of medication noncompliance, substance use (Utox + THC), and psychosocial stressors (family, housing). Patient deny active SI/HI. Patient does not appear acutely manic, intoxicated, or withdrawing from substances. Patient was disorganized with flat affect and possibly delusional and paranoid about his mom. Additionally, per chart, patient was agitated with disorganized behaviors at home and in ED. Patient requires involuntary inpatient psychiatric admission for safety, stabilization, and treatment.     Ddx: schizophrenia, substance-induced psychotic disorder, schizoaffective, PTSD    Today, patient appeared slightly more linear but continues to refuse medications to treat his drug-induced gynecomastia. In good behavioral control. Discharge focused.     Plan  1. Legal: Admitted on 9.27.   2. Safety: No reported SI/SIB/HI/VI currently on unit; continue routine observation.   -psychotropic PRN medications for safety: Haldol 5mg PO q6h prn agitation  3. Psychiatric:   2 loading doses of Invega Sustenna completed  Paliperidone Sustenna 234 mg 4/28   Paliperidone Sustenna 156mg 5/2/23   -c/w depakote 1000mg qhs for mood staabilization  -c/w PRN 2mg nicotine gum q2h for nicotine cravings  4. Therapy: group & milieu therapy  5. Medical: neck pain  -c/w PRN tylenol q6h for neck pain  -c/w heat/ ice pack as requested for neck pain  -c/w Ensure supplement  4/27 Prolactin 37.9, repeat prolactin 5/2 45.7 - patient refused medications (cabergoline, abilify) to decrease prolactin.  6. Collateral: Collateral from family  7. Disposition: When stable

## 2023-05-08 NOTE — BH PSYCHOLOGY - GROUP THERAPY NOTE - NSBHPSYCHOLPARTICIPCOMMENT_PSY_A_CORE FT
Pt provided consent to join group psychotherapy session with writer and fellow peers. Group session focused on providing pt's with the opportunity to discuss their experiences on the unit. Pts shared difficult situations with treatment providers and adjusting to their roommates. Writer provided a focused activity and encouraged pts to reflect on five things that make their life meaningful. Pts shared their values with fellow group members and provided each other with feedback. Pts were also encouraged to reflect on a time they felt frustrated or upset and describe something positive that resulted from the challenging situation. Pt shared negative interactions with his treatment provider and shared that he was able to assert himself in a confident, respectful manner in order to communicate his needs. 
Pt consented to join group psychotherapy session with writer. Group session was focused on identifying healthy vs unhealthy coping strategies. Writer provided example scenarios of situations in which individuals in a vignette coped using unhealthy strategies. Writer provided pts with discussion questions related to the vignettes provided. Pts were also provided with the opportunity to discuss a potential problem they are experiencing and list both adaptive and maladaptive coping strategies. Pt exhibited disorganized and tangential thinking, as evidenced by his contributions made in group. 
Patient attended Psychology Group, and actively and meaningfully participated in the group discussion on Mental Wellness. Group members discussed ways in which they have maintained their focus on recovery in the hospital setting. Members also identified certain distractions that have served to impede their recovery process. Group members shared coping mechanisms they have utilized to manage distress on the unit. Patient explained that he has utilized "community" and "connections" with peers on the unit. He added that he has come to learn that marijuana, though it has helped in the past, is detrimental to him staying well. He provided positive feedback and support to peers.  provided psychoeducation on healthy coping mechanisms and offered validation with good effect. 
Pt consented to join group psychotherapy session with writer. Group session was focused on identifying values and circles of influence. Pts discussed ways in which their values play a role in shaping their goals, priorities, and identity. The group dynamic explored how values are influenced by their own beliefs, as well as by their family, friends, and society. Group members discussed spears values, such as authenticity, love, compassion, and stability. Pt shared his value with group, specifically sharing that he values being royal because he is a "lon."

## 2023-05-08 NOTE — BH PSYCHOLOGY - GROUP THERAPY NOTE - NSPSYCHOLGRPCOGINT_PSY_A_CORE
explore reducing vulnerability to stress
other..

## 2023-05-08 NOTE — BH PSYCHOLOGY - GROUP THERAPY NOTE - NSBHPSYCHOLASSESSPROV_PSY_A_CORE
Licensed Psychologist and Psychology Trainee
Licensed Psychologist and Psychology Trainee
Psychology Trainee only
Licensed Psychologist

## 2023-05-08 NOTE — BH PSYCHOLOGY - GROUP THERAPY NOTE - NSPSYCHOLGRPCOGPT_PSY_A_CORE
participated in exercise
participated in exercise/shared positive coping experience/thought disordered/gave feedback to others
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
## 2023-05-08 NOTE — BH PSYCHOLOGY - GROUP THERAPY NOTE - NSBHPSYCHOLRESPONSE_PSY_A_CORE
Accepted support
Symptoms reduced/Coping skills acquired/Accepted support

## 2023-05-08 NOTE — BH INPATIENT PSYCHIATRY PROGRESS NOTE - MSE UNSTRUCTURED FT
On exam today the patient is calm and cooperative.  Speech is clear.  Thought process: More linear  Thought content: Continues to be somatically preoccupied  Perception: Denies auditory hallucinations or other perceptual disturbances  Mood: Describes as "great".   Affect: euthymic, full, stable, intense  Patient denies passive/active suicidal ideation, intention and plan.    Patient denies  aggressive/homicidal ideation, intent or plan.   Patient is alert and oriented .   Fund of knowledge is fair. Memory is improving.  Insight and judgment are improving.   Impulse control is intact at this time.

## 2023-05-08 NOTE — BH PSYCHOLOGY - GROUP THERAPY NOTE - NSPSYCHOLGRPCOGGOAL_PSY_A_CORE
You are advised to follow closely with Dr Warner and PCP of your choice in 2-3 days for recheck, final results of lab work and imaging testing, and further testing/treatment as needed.     Drink plenty of fluids   Meds as previously directed    Please return to the emergency department immediately with persistent or worsening chest pain different than usual for you, shortness of air, abdominal pain, persistent vomiting/fever, blood in emesis or stool, lightheadedness/fainting, problems with speech, one sided weakness/numbness, new incontinence, problems with vision, or for worsening of symptoms or other concerns.    
other...
reduce reaction to psychosocial stressors

## 2023-05-08 NOTE — BH PSYCHOLOGY - GROUP THERAPY NOTE - NSPSYCHOLGRPCOGPROB_PSY_A_CORE
high reactivity to psychosocial stressor
high reactivity to psychosocial stressor
other...
high reactivity to psychosocial stressor

## 2023-05-08 NOTE — BH INPATIENT PSYCHIATRY PROGRESS NOTE - NSBHATTESTCOMMENTATTENDFT_PSY_A_CORE
38 year old, male; domiciled with family (mom, dad, 2 brothers, 1 sister and brother-in-law); single (possible has a gf); no known dependents; unemployed on disability; PPH of schizoaffective vs bipolar with psychosis; multiple inpatient psychiatric hospitalizations (last discharged from [facility] ~mid-April, given Risperdal Consta); no known suicide attempts; h/o physically aggressive; active cannabis abuse and h/o ETOH abuse, no known history of complicated withdrawal; PMH childhood asthma; brought in by EMS; called by parents; for bizarre behavior and agitation in the context of noncompliance with treatment.     05/08  Clinical Update  Patient remains  focused on his discharge but has been more cooperative and less paranoid and disorganized in his thought process  Still unwilling to consider aripiprazole or cabergoline though now states his "breast mass burst inside" his chest and is no longer an issue  Reports having a nice talk with his mother

## 2023-05-09 PROCEDURE — 99231 SBSQ HOSP IP/OBS SF/LOW 25: CPT

## 2023-05-09 RX ADMIN — DIVALPROEX SODIUM 1000 MILLIGRAM(S): 500 TABLET, DELAYED RELEASE ORAL at 20:30

## 2023-05-09 NOTE — BH INPATIENT PSYCHIATRY PROGRESS NOTE - NSBHASSESSSUMMFT_PSY_ALL_CORE
Patient is a 38 year old, male; domiciled with family; single; no known dependents; unemployed on disability; PPH of schizoaffective vs bipolar with psychosis; multiple inpatient psychiatric hospitalizations (last discharged from [facility] ~mid-April, given Risperdal Consta); no known suicide attempts; h/o physically aggressive; active cannabis abuse and h/o ETOH abuse, no known history of complicated withdrawal; PMH childhood asthma; brought in by EMS; called by parents; for bizarre behavior and agitation in the context of noncompliance with treatment.     Patient presents with symptoms consistent with psychosis in the setting of medication noncompliance, substance use (Utox + THC), and psychosocial stressors (family, housing). Patient deny active SI/HI. Patient does not appear acutely manic, intoxicated, or withdrawing from substances. Patient was disorganized with flat affect and possibly delusional and paranoid about his mom. Additionally, per chart, patient was agitated with disorganized behaviors at home and in ED. Patient requires involuntary inpatient psychiatric admission for safety, stabilization, and treatment.     Ddx: schizophrenia, substance-induced psychotic disorder, schizoaffective, PTSD    Today, patient appeared slightly more linear. In good behavioral control. Discharge focused.     Plan  1. Legal: Admitted on 9.27.   2. Safety: No reported SI/SIB/HI/VI currently on unit; continue routine observation.   -psychotropic PRN medications for safety: Haldol 5mg PO q6h prn agitation  3. Psychiatric:   2 loading doses of Invega Sustenna completed  Paliperidone Sustenna 234 mg 4/28   Paliperidone Sustenna 156mg 5/2/23   -c/w depakote 1000mg qhs for mood stabilization  -c/w PRN 2mg nicotine gum q2h for nicotine cravings  4. Therapy: group & milieu therapy  5. Medical: neck pain  -c/w PRN tylenol q6h for neck pain  -c/w heat/ ice pack as requested for neck pain  -c/w Ensure supplement  4/27 Prolactin 37.9, repeat prolactin 5/2 45.7 - patient refused medications (cabergoline, abilify) to decrease prolactin.  6. Collateral: Collateral from family  7. Disposition: When stable

## 2023-05-09 NOTE — BH INPATIENT PSYCHIATRY PROGRESS NOTE - NSBHATTESTCOMMENTATTENDFT_PSY_A_CORE
38 year old, male; domiciled with family (mom, dad, 2 brothers, 1 sister and brother-in-law); single (possible has a gf); no known dependents; unemployed on disability; PPH of schizoaffective vs bipolar with psychosis; multiple inpatient psychiatric hospitalizations (last discharged from [facility] ~mid-April, given Risperdal Consta); no known suicide attempts; h/o physically aggressive; active cannabis abuse and h/o ETOH abuse, no known history of complicated withdrawal; PMH childhood asthma; brought in by EMS; called by parents; for bizarre behavior and agitation in the context of noncompliance with treatment.     05/09  Clinical Update  Patient remains  focused on his discharge   Reports improvement in his chest discomfort  Reports that he thinks his mother will allow him to return home

## 2023-05-09 NOTE — BH INPATIENT PSYCHIATRY PROGRESS NOTE - MSE UNSTRUCTURED FT
On exam today the patient is calm and cooperative.  Speech is clear.  Thought process: More linear  Thought content: Continues to be somatically preoccupied  Perception: Denies auditory hallucinations or other perceptual disturbances  Mood: Describes as "good".   Affect: euthymic, full, stable, intense  Patient denies passive/active suicidal ideation, intention and plan.    Patient denies  aggressive/homicidal ideation, intent or plan.   Patient is alert and oriented .   Fund of knowledge is fair. Memory is improving.  Insight and judgment are improving.   Impulse control is intact at this time.

## 2023-05-09 NOTE — BH INPATIENT PSYCHIATRY PROGRESS NOTE - NSBHFUPINTERVALHXFT_PSY_A_CORE
Patient seen for follow up of psychosis. Chart reviewed and case discussed with interdisciplinary team. No acute events.   Today, patient reports feeling 'good'. Reports his gynecomastia has improved. Continues to ask about discharge. 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 Reports he can go back to his mom's house. Reports his mom works until 3pm so the psychiatry team should call her after 3pm. Asked about getting access to his medical records.

## 2023-05-10 PROCEDURE — 99231 SBSQ HOSP IP/OBS SF/LOW 25: CPT

## 2023-05-10 RX ORDER — DIVALPROEX SODIUM 500 MG/1
2 TABLET, DELAYED RELEASE ORAL
Qty: 28 | Refills: 0
Start: 2023-05-10 | End: 2023-05-23

## 2023-05-10 RX ADMIN — DIVALPROEX SODIUM 1000 MILLIGRAM(S): 500 TABLET, DELAYED RELEASE ORAL at 21:04

## 2023-05-10 NOTE — BH TREATMENT PLAN - NSTXPLANTHERAPYSESSIONSFT_PSY_ALL_CORE
05-09-23  Type of therapy: Leisure development,Peer advocate,Spirituality  Type of session: Individual  Level of patient participation: Attentive,Engaged  Duration of participation: 15 minutes  Therapy conducted by: Psych rehab  Therapy Summary: Writer met with Pt to assess his progress towards Psych Rehab goal. Pt was receptive. Pt was seen in the day area of the unit. Pt was pleasant, calm and engaged but discharge focused throughout the session. Pt appeared less disorganized and delusional than last week. Pt reports that the lump of his chest burst. Pt continues to attribute the lump as the side effect of meds. Pt denied any neck pain. Pt denies SI/HI/AH/VH.  Pt reports good appetite and sleep. 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 Over the past week pt made progress towards Psych Rehab goal.  Pt is complaint with meds but continues to refuse the adjustment of his meds. Pt denies all his symptoms and his insight into his mental illness remains impaired.  Pt is visible on the unit. Pt attends groups but unable to tolerate group structure. Pt is unable to participates in group meaningfully. Psych rehab staff will continue to engage patient daily in order to maintain rapport, provide support and assist patient in demonstrating progress towards Psychiatric Rehabilitation goals over the next 7 days.  
  05-02-23  Type of therapy: Creative arts therapy,Leisure development,Mindfulness,Music therapy,Peer advocate,Pet therapy,Spirituality  Type of session: Individual  Level of patient participation: Engaged  Duration of participation: 15 minutes  Therapy conducted by: Psych rehab  Therapy Summary: Writer met with Pt to assess his progress towards Psych Rehab goal. Pt was receptive. Pt was seen in the day area of the unit. Pt was pleasant, polite but disorganized and delusional throughout the session. Pt reports that he feels he is moving on and does not look back. Pt states, “I am on the top of the world.”  Pt reports that he is having a lump on his chest and nipple pain. Pt attribute the lump and nipple pain the side effects of his meds. Pt continues to demand an Xray for his neck and he reports that rain causes his neck pain. Pt denies SI/HI/AH/VH. Pt reports that he can sleep only if takes his meds otherwise he can stay awake more than a week. Pt reports good appetite. Pt reports that his mother visited him and she gave consent that he can live with her upon discharge. Per the chart his mother does not want him back with her and she wants the pt to have his own place.  Over the past week pt made progress towards Psych Rehab goal and has been complaint with meds. However, his insight into his symptoms remain impaired. Pt is visible on the unit. Pt attends groups but unable to tolerate group structure. Pt is unable to participates in group meaningfully and remains disorganized. Psych rehab staff will continue to engage patient daily in order to maintain rapport, provide support and assist patient in demonstrating progress towards Psychiatric Rehabilitation goals over the next 7 days.

## 2023-05-10 NOTE — BH TREATMENT PLAN - NSTXDCOPNOINTERSW_PSY_ALL_CORE
ALISA provides support to pt, discussed discharge planning, was in contact with pt's health home care coordinator, Julia Hernandez from [facility] at [phone]. Pt and care coordinator agreed to make [facility]PA application for pt. for supported housing, ALISA faxed clinical information to care coordinator ALISA has been having difficulty reaching pt's mother, left [facility]. Pt reported his mother will allow pt to return home awaiting SPA housing. ALISA collaborated with treatment team.
SW will continue to address consent for collaterals with patient. SW will provide discharge planning and will collaborate with treatment team.

## 2023-05-10 NOTE — BH TREATMENT PLAN - NSTXPATIENTPARTICIPATE_PSY_ALL_CORE
Patient participated in identification of needs/problems/goals for treatment/Patient participated in defining interventions
Patient participated in identification of needs/problems/goals for treatment/Patient participated in defining interventions/Patient participated in development of after care plan
Patient participated in identification of needs/problems/goals for treatment/Patient participated in defining interventions/Patient participated in development of after care plan

## 2023-05-10 NOTE — BH INPATIENT PSYCHIATRY PROGRESS NOTE - NSBHASSESSSUMMFT_PSY_ALL_CORE
Patient is a 38 year old, male; domiciled with family; single; no known dependents; unemployed on disability; PPH of schizoaffective vs bipolar with psychosis; multiple inpatient psychiatric hospitalizations (last discharged from [facility] ~mid-April, given Risperdal Consta); no known suicide attempts; h/o physically aggressive; active cannabis abuse and h/o ETOH abuse, no known history of complicated withdrawal; PMH childhood asthma; brought in by EMS; called by parents; for bizarre behavior and agitation in the context of noncompliance with treatment.     Patient presents with symptoms consistent with psychosis in the setting of medication noncompliance, substance use (Utox + THC), and psychosocial stressors (family, housing). Patient deny active SI/HI. Patient does not appear acutely manic, intoxicated, or withdrawing from substances. Patient was disorganized with flat affect and possibly delusional and paranoid about his mom. Additionally, per chart, patient was agitated with disorganized behaviors at home and in ED. Patient requires involuntary inpatient psychiatric admission for safety, stabilization, and treatment.     Ddx: schizophrenia, substance-induced psychotic disorder, schizoaffective, PTSD   5/10 Update   patient appeared slightly more irritable and illogical today when discussing his request for discharge and given info from mother that he cannot return home at this time .     Plan  1. Legal: Admitted on 9.27.   2. Safety: No reported SI/SIB/HI/VI currently on unit; continue routine observation.   -psychotropic PRN medications for safety: Haldol 5mg PO q6h prn agitation  3. Psychiatric:   2 loading doses of Invega Sustenna completed  Paliperidone Sustenna 234 mg 4/28   Paliperidone Sustenna 156mg 5/2/23   -c/w depakote 1000mg qhs for mood stabilization  -c/w PRN 2mg nicotine gum q2h for nicotine cravings  4. Therapy: group & milieu therapy  5. Medical: neck pain  -c/w PRN tylenol q6h for neck pain  -c/w heat/ ice pack as requested for neck pain  -c/w Ensure supplement  4/27 Prolactin 37.9, repeat prolactin 5/2 45.7 - patient refused medications (cabergoline, abilify) to decrease prolactin.  6. Collateral: Collateral from family  7. Disposition: When stable

## 2023-05-10 NOTE — BH TREATMENT PLAN - NSTXMEDICINTERPR_PSY_ALL_CORE
Pt is complaint with meds. Psych rehab staff will continue to engage patient daily in order to maintain rapport, provide support and assist patient in demonstrating progress towards Psychiatric Rehabilitation goals over the next 7 days.
Over the past week pt made progress towards Psych Rehab goal.  Pt is complaint with meds but continues to refuse the adjustment of his meds. Pt denies all his symptoms and his insight into his mental illness remains impaired.  Pt is visible on the unit. Pt attends groups but unable to tolerate group structure. Pt is unable to participates in group meaningfully. Psych rehab staff will continue to engage patient daily in order to maintain rapport, provide support and assist patient in demonstrating progress towards Psychiatric Rehabilitation goal over the next 7 days.

## 2023-05-10 NOTE — BH INPATIENT PSYCHIATRY PROGRESS NOTE - NSBHFUPINTERVALHXFT_PSY_A_CORE
Patient seen for follow up of psychosis.   Chart reviewed and case discussed with interdisciplinary team.   No acute events.   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

## 2023-05-10 NOTE — BH TREATMENT PLAN - NSCMSPTSTRENGTHS_PSY_ALL_CORE
Assertive/Courageous/Future/goal oriented/Leisure interest/Physically healthy/Resourceful/Supportive family
Compliance to treatment/[place]/spirituality/Future/goal oriented/Interpersonal skills/Resourceful/Supportive family
Compliance to treatment/[place]/spirituality/Future/goal oriented/Interpersonal skills/Resourceful/Supportive family

## 2023-05-10 NOTE — BH INPATIENT PSYCHIATRY PROGRESS NOTE - MSE UNSTRUCTURED FT
On exam today the patient is somewhat more irritable and demanding today   Speech is clear but somewhat rapid in rate  Thought process: More linear  Thought content: Continues to be somatically preoccupied and at times illogical  Perception: Denies auditory hallucinations or other perceptual disturbances  Mood: Describes as "good".   Affect: euthymic, full, stable, intense  Patient denies passive/active suicidal ideation, intention and plan.    Patient denies  aggressive/homicidal ideation, intent or plan.   Patient is alert and oriented .   Fund of knowledge is fair. Memory is improving.  Insight and judgment are improving.   Impulse control is intact at this time.

## 2023-05-11 PROCEDURE — 99231 SBSQ HOSP IP/OBS SF/LOW 25: CPT

## 2023-05-11 RX ADMIN — DIVALPROEX SODIUM 1000 MILLIGRAM(S): 500 TABLET, DELAYED RELEASE ORAL at 20:20

## 2023-05-11 NOTE — DIETITIAN INITIAL EVALUATION ADULT - PERTINENT LABORATORY DATA
4/25 lipids wnl; 4/23 CMP wnl; 4/23 Hgb 12.5 Hct 37.1    A1C with Estimated Average Glucose Result: 5.4 % (04-25-23 @ 08:21)

## 2023-05-11 NOTE — BH INPATIENT PSYCHIATRY PROGRESS NOTE - NSBHFUPINTERVALHXFT_PSY_A_CORE
Patient seen for follow up of psychosis. Chart reviewed and case discussed with interdisciplinary team. No acute events.   Today, patient reports feeling "tired". Reports stomach pain that improves with Ensure. Reports he is also ok with a temporary stay in the shelter. Denies other complaints.

## 2023-05-11 NOTE — BH INPATIENT PSYCHIATRY PROGRESS NOTE - MSE UNSTRUCTURED FT
On exam today the patient is calm and cooperative  Speech is clear but somewhat rapid in rate  Thought process: More linear  Thought content: Continues to be somatically preoccupied  Perception: Denies auditory hallucinations or other perceptual disturbances  Mood: Describes as "tired".   Affect: tired, constricted, stable, intense  Patient denies passive/active suicidal ideation, intention and plan.    Patient denies  aggressive/homicidal ideation, intent or plan.   Patient is alert and oriented .   Fund of knowledge is fair. Memory is improving.  Insight and judgment are improving.   Impulse control is intact at this time.

## 2023-05-11 NOTE — BH INPATIENT PSYCHIATRY PROGRESS NOTE - NSBHATTESTCOMMENTATTENDFT_PSY_A_CORE
38 year old, male; domiciled with family (mom, dad, 2 brothers, 1 sister and brother-in-law); single (possible has a gf); no known dependents; unemployed on disability; PPH of schizoaffective vs bipolar with psychosis; multiple inpatient psychiatric hospitalizations (last discharged from [facility] ~mid-April, given Risperdal Consta); no known suicide attempts; h/o physically aggressive; active cannabis abuse and h/o ETOH abuse, no known history of complicated withdrawal; PMH childhood asthma; brought in by EMS; called by parents; for bizarre behavior and agitation in the context of noncompliance with treatment.     05/11  Clinical Update  Patient remains  focused on his discharge   With some  improvement in his thought process but with no insight into his illness or his home situation   Reports that he thinks his mother will allow him to return home

## 2023-05-11 NOTE — DIETITIAN INITIAL EVALUATION ADULT - OTHER INFO
Patient is a 38 year old, male; PPH of schizoaffective vs bipolar with psychosis; multiple inpatient psychiatric hospitalizations (last discharged from [facility] ~mid-April, given Risperdal Consta); no known suicide attempts; h/o physically aggressive; active cannabis abuse and h/o ETOH abuse, no known history of complicated withdrawal; PMH childhood asthma; brought in by EMS; called by parents; for bizarre behavior and agitation in the context of noncompliance with treatment.  Spoke to patient in his room who was lying in his bed with covers over him. Patient endorses good appetite. States he had a stomach ache this morning and drank an Ensure which made him feel better. Denies n/v/d/c. Has food preferences in place which were discussed and updated. Denies any weight changes, states weighs around 140#. Is drinking the Ensure supplement.

## 2023-05-11 NOTE — BH INPATIENT PSYCHIATRY PROGRESS NOTE - NSBHASSESSSUMMFT_PSY_ALL_CORE
Patient is a 38 year old, male; domiciled with family; single; no known dependents; unemployed on disability; PPH of schizoaffective vs bipolar with psychosis; multiple inpatient psychiatric hospitalizations (last discharged from [facility] ~mid-April, given Risperdal Consta); no known suicide attempts; h/o physically aggressive; active cannabis abuse and h/o ETOH abuse, no known history of complicated withdrawal; PMH childhood asthma; brought in by EMS; called by parents; for bizarre behavior and agitation in the context of noncompliance with treatment.     Patient presents with symptoms consistent with psychosis in the setting of medication noncompliance, substance use (Utox + THC), and psychosocial stressors (family, housing). Patient deny active SI/HI. Patient does not appear acutely manic, intoxicated, or withdrawing from substances. Patient was disorganized with flat affect and possibly delusional and paranoid about his mom. Additionally, per chart, patient was agitated with disorganized behaviors at home and in ED. Patient requires involuntary inpatient psychiatric admission for safety, stabilization, and treatment.     Ddx: schizophrenia, substance-induced psychotic disorder, schizoaffective, PTSD    Today, patient is calm and in good behavioral control. Dispo planning    Plan  1. Legal: Admitted on 9.27.   2. Safety: No reported SI/SIB/HI/VI currently on unit; continue routine observation.   -psychotropic PRN medications for safety: Haldol 5mg PO q6h prn agitation  3. Psychiatric:   2 loading doses of Invega Sustenna completed  Paliperidone Sustenna 234 mg 4/28   Paliperidone Sustenna 156mg 5/2/23   -c/w depakote 1000mg qhs for mood stabilization  -c/w PRN 2mg nicotine gum q2h for nicotine cravings  4. Therapy: group & milieu therapy  5. Medical: neck pain  -c/w PRN tylenol q6h for neck pain  -c/w heat/ ice pack as requested for neck pain  -c/w Ensure supplement  4/27 Prolactin 37.9, repeat prolactin 5/2 45.7 - patient refused medications (cabergoline, abilify) to decrease prolactin.  6. Collateral: Collateral from family  7. Disposition: When stable

## 2023-05-11 NOTE — DIETITIAN INITIAL EVALUATION ADULT - PERTINENT MEDS FT
MEDICATIONS  (STANDING):  divalproex DR 1000 milliGRAM(s) Oral at bedtime    MEDICATIONS  (PRN):  acetaminophen     Tablet .. 650 milliGRAM(s) Oral every 6 hours PRN Moderate Pain (4 - 6)  diphenhydrAMINE 50 milliGRAM(s) Oral every 6 hours PRN agitation  haloperidol     Tablet 5 milliGRAM(s) Oral every 6 hours PRN agitation  haloperidol    Injectable 5 milliGRAM(s) IntraMuscular Once PRN agitation  loratadine 10 milliGRAM(s) Oral daily PRN allergies  LORazepam     Tablet 2 milliGRAM(s) Oral every 6 hours PRN agitation  nicotine  Polacrilex Gum 2 milliGRAM(s) Oral every 2 hours PRN nicotine cravings  traZODone 50 milliGRAM(s) Oral at bedtime PRN Insomnia

## 2023-05-12 PROCEDURE — 99231 SBSQ HOSP IP/OBS SF/LOW 25: CPT | Mod: GC

## 2023-05-12 RX ADMIN — DIVALPROEX SODIUM 1000 MILLIGRAM(S): 500 TABLET, DELAYED RELEASE ORAL at 20:59

## 2023-05-12 NOTE — BH INPATIENT PSYCHIATRY PROGRESS NOTE - NSBHATTESTCOMMENTATTENDFT_PSY_A_CORE
Patient is irritable on exam today, discharge focused and otherwise superficially denying all psychotic symptoms. Focused on returning home to live with his family, saying his mother changed her mind about his ability to return home. No physical complaints, no SI/HI, tolerating medications. Continue plan as above.

## 2023-05-12 NOTE — BH INPATIENT PSYCHIATRY PROGRESS NOTE - NSBHFUPINTERVALHXFT_PSY_A_CORE
Patient seen for follow up of psychosis. Chart reviewed and case discussed with interdisciplinary team. No acute events.   Today, patient reports feeling "ok". Continues to report mom will accept him home despite mom telling psychiatry team that she will not accept him home. Reports he is also ok with a temporary stay in the shelter. Denies other complaints.  Patient seen for follow up of psychosis. Chart reviewed and case discussed with interdisciplinary team. No acute events.   Today, patient reports feeling "ok". Dispo focused. Denies other complaints.     This afternoon, writer called mom who reports it is now ok for patient to come home. Reports they had a big family meeting and decided it was better for the patient to return home instead the shelter.  Patient seen for follow up of psychosis. Chart reviewed and case discussed with interdisciplinary team. No acute events overnight. No PRNs required/requested.      Today, patient reports feeling "ok". Discharge focused, repeatedly stating that his mother will now allow him to come home. Denies other complaints; denies paranoia, other delusions, AVH, SI/HI. Denies physical complaints or c/f medication issues or side effects. Doesn't want to spend significant time talking about anything other than discharge.    This afternoon, writer called mom who reports it is now ok for patient to come home. Reports they had a big family meeting and decided it was better for the patient to return home instead of the shelter.

## 2023-05-12 NOTE — BH INPATIENT PSYCHIATRY PROGRESS NOTE - NSBHASSESSSUMMFT_PSY_ALL_CORE
Patient is a 38 year old, male; domiciled with family; single; no known dependents; unemployed on disability; PPH of schizoaffective vs bipolar with psychosis; multiple inpatient psychiatric hospitalizations (last discharged from [facility] ~mid-April, given Risperdal Consta); no known suicide attempts; h/o physically aggressive; active cannabis abuse and h/o ETOH abuse, no known history of complicated withdrawal; PMH childhood asthma; brought in by EMS; called by parents; for bizarre behavior and agitation in the context of noncompliance with treatment.     Patient presents with symptoms consistent with psychosis in the setting of medication noncompliance, substance use (Utox + THC), and psychosocial stressors (family, housing). Patient deny active SI/HI. Patient does not appear acutely manic, intoxicated, or withdrawing from substances. Patient was disorganized with flat affect and possibly delusional and paranoid about his mom. Additionally, per chart, patient was agitated with disorganized behaviors at home and in ED. Patient requires involuntary inpatient psychiatric admission for safety, stabilization, and treatment.     Ddx: schizophrenia, substance-induced psychotic disorder, schizoaffective, PTSD    Today, patient is calm and in good behavioral control. Dispo planning - patient agreeable to temporary stay in shelter.    Plan  1. Legal: Admitted on 9.27.   2. Safety: No reported SI/SIB/HI/VI currently on unit; continue routine observation.   -psychotropic PRN medications for safety: Haldol 5mg PO q6h prn agitation  3. Psychiatric:   2 loading doses of Invega Sustenna completed  Paliperidone Sustenna 234 mg 4/28   Paliperidone Sustenna 156mg 5/2/23   -c/w depakote 1000mg qhs for mood stabilization  -c/w PRN 2mg nicotine gum q2h for nicotine cravings  4. Therapy: group & milieu therapy  5. Medical: neck pain  -c/w PRN tylenol q6h for neck pain  -c/w heat/ ice pack as requested for neck pain  -c/w Ensure supplement  4/27 Prolactin 37.9, repeat prolactin 5/2 45.7 - patient refused medications (cabergoline, abilify) to decrease prolactin.  6. Collateral: Collateral from family  7. Disposition: When stable       Patient is a 38 year old, male; domiciled with family; single; no known dependents; unemployed on disability; PPH of schizoaffective vs bipolar with psychosis; multiple inpatient psychiatric hospitalizations (last discharged from [facility] ~mid-April, given Risperdal Consta); no known suicide attempts; h/o physically aggressive; active cannabis abuse and h/o ETOH abuse, no known history of complicated withdrawal; PMH childhood asthma; brought in by EMS; called by parents; for bizarre behavior and agitation in the context of noncompliance with treatment.     Patient presents with symptoms consistent with psychosis in the setting of medication noncompliance, substance use (Utox + THC), and psychosocial stressors (family, housing). Patient deny active SI/HI. Patient does not appear acutely manic, intoxicated, or withdrawing from substances. Patient was disorganized with flat affect and possibly delusional and paranoid about his mom. Additionally, per chart, patient was agitated with disorganized behaviors at home and in ED. Patient requires involuntary inpatient psychiatric admission for safety, stabilization, and treatment.     Ddx: schizophrenia, substance-induced psychotic disorder, schizoaffective, PTSD    Today, patient is calm and in good behavioral control. Mom reports it is now ok for patient to return home. Will set up outpatient psychiatry follow up. Likely discharge early next week.     Plan  1. Legal: Admitted on 9.27.   2. Safety: No reported SI/SIB/HI/VI currently on unit; continue routine observation.   -psychotropic PRN medications for safety: Haldol 5mg PO q6h prn agitation  3. Psychiatric:   2 loading doses of Invega Sustenna completed  Paliperidone Sustenna 234 mg 4/28   Paliperidone Sustenna 156mg 5/2/23   -c/w depakote 1000mg qhs for mood stabilization  -c/w PRN 2mg nicotine gum q2h for nicotine cravings  4. Therapy: group & milieu therapy  5. Medical: neck pain  -c/w PRN tylenol q6h for neck pain  -c/w heat/ ice pack as requested for neck pain  -c/w Ensure supplement  4/27 Prolactin 37.9, repeat prolactin 5/2 45.7 - patient refused medications (cabergoline, abilify) to decrease prolactin.  6. Collateral: Collateral from family  7. Disposition: When stable

## 2023-05-12 NOTE — BH INPATIENT PSYCHIATRY PROGRESS NOTE - MSE UNSTRUCTURED FT
On exam today the patient is calm and cooperative  Speech is clear but somewhat rapid in rate  Thought process: More linear  Thought content: Discharge focused  Perception: Denies auditory hallucinations or other perceptual disturbances  Mood: Describes as "ok".   Affect: tired, constricted, stable  Patient denies passive/active suicidal ideation, intention and plan.    Patient denies  aggressive/homicidal ideation, intent or plan.   Patient is alert and oriented .   Fund of knowledge is fair. Memory is improving.  Insight and judgment are improving.   Impulse control is intact at this time.   On exam today the patient is calm and cooperative  Speech is clear.  Thought process: Linear  Thought content: Discharge focused  Perception: Denies auditory hallucinations or other perceptual disturbances  Mood: Describes as "ok".   Affect: tired, constricted, stable  Patient denies passive/active suicidal ideation, intention and plan.    Patient denies  aggressive/homicidal ideation, intent or plan.   Patient is alert and oriented .   Fund of knowledge is fair. Memory is improving.  Insight and judgment are improving.   Impulse control is intact at this time.   On exam today the patient is calm and superficiaally cooperative  Speech is clear.  Thought process: Linear  Thought content: Discharge focused, no delusional content elicited  Perception: Denies auditory hallucinations or other perceptual disturbances  Mood: Describes as "ok".   Affect: irritable, constricted, stable  Patient denies passive/active suicidal ideation, intention and plan.    Patient denies  aggressive/homicidal ideation, intent or plan.   Patient is alert and oriented .   Fund of knowledge is fair. Memory is improving.  Insight and judgment are improving.   Impulse control is intact at this time.

## 2023-05-13 RX ADMIN — DIVALPROEX SODIUM 1000 MILLIGRAM(S): 500 TABLET, DELAYED RELEASE ORAL at 22:00

## 2023-05-13 RX ADMIN — Medication 50 MILLIGRAM(S): at 22:29

## 2023-05-14 RX ADMIN — DIVALPROEX SODIUM 1000 MILLIGRAM(S): 500 TABLET, DELAYED RELEASE ORAL at 20:57

## 2023-05-15 PROCEDURE — 99231 SBSQ HOSP IP/OBS SF/LOW 25: CPT

## 2023-05-15 RX ADMIN — DIVALPROEX SODIUM 1000 MILLIGRAM(S): 500 TABLET, DELAYED RELEASE ORAL at 20:51

## 2023-05-15 NOTE — BH INPATIENT PSYCHIATRY PROGRESS NOTE - NSBHATTESTCOMMENTATTENDFT_PSY_A_CORE
38 year old, male; domiciled with family (mom, dad, 2 brothers, 1 sister and brother-in-law); single (possible has a gf); no known dependents; unemployed on disability; PPH of schizoaffective vs bipolar with psychosis; multiple inpatient psychiatric hospitalizations (last discharged from [facility] ~mid-April, given Risperdal Consta); no known suicide attempts; h/o physically aggressive; active cannabis abuse and h/o ETOH abuse, no known history of complicated withdrawal; PMH childhood asthma; brought in by EMS; called by parents; for bizarre behavior and agitation in the context of noncompliance with treatment.     05/15  Clinical Update  Patient remains  focused on his discharge and now reports mom visited and will allow him to return home   With improvement in his thought process and behavior

## 2023-05-15 NOTE — BH INPATIENT PSYCHIATRY PROGRESS NOTE - NSBHASSESSSUMMFT_PSY_ALL_CORE
Patient is a 38 year old, male; domiciled with family; single; no known dependents; unemployed on disability; PPH of schizoaffective vs bipolar with psychosis; multiple inpatient psychiatric hospitalizations (last discharged from [facility] ~mid-April, given Risperdal Consta); no known suicide attempts; h/o physically aggressive; active cannabis abuse and h/o ETOH abuse, no known history of complicated withdrawal; PMH childhood asthma; brought in by EMS; called by parents; for bizarre behavior and agitation in the context of noncompliance with treatment.     Patient presents with symptoms consistent with psychosis in the setting of medication noncompliance, substance use (Utox + THC), and psychosocial stressors (family, housing). Patient deny active SI/HI. Patient does not appear acutely manic, intoxicated, or withdrawing from substances. Patient was disorganized with flat affect and possibly delusional and paranoid about his mom. Additionally, per chart, patient was agitated with disorganized behaviors at home and in ED. Patient requires involuntary inpatient psychiatric admission for safety, stabilization, and treatment.     Ddx: schizophrenia, substance-induced psychotic disorder, schizoaffective, PTSD    5/15  Today, patient is calm and in good behavioral control. Mom reports it is now ok for patient to return home. Will set up outpatient psychiatry follow up. Likely discharge early next week.     Plan  1. Legal: Admitted on 9.27.   2. Safety: No reported SI/SIB/HI/VI currently on unit; continue routine observation.   -psychotropic PRN medications for safety: Haldol 5mg PO q6h prn agitation  3. Psychiatric:   2 loading doses of Invega Sustenna completed  Paliperidone Sustenna 234 mg 4/28   Paliperidone Sustenna 156mg 5/2/23   -c/w depakote 1000mg qhs for mood stabilization  -c/w PRN 2mg nicotine gum q2h for nicotine cravings  4. Therapy: group & milieu therapy  5. Medical: neck pain  -c/w PRN tylenol q6h for neck pain  -c/w heat/ ice pack as requested for neck pain  -c/w Ensure supplement  4/27 Prolactin 37.9, repeat prolactin 5/2 45.7 - patient refused medications (cabergoline, abilify) to decrease prolactin.  6. Collateral: Collateral from family  7. Disposition: When stable

## 2023-05-15 NOTE — BH INPATIENT PSYCHIATRY PROGRESS NOTE - MSE UNSTRUCTURED FT
On exam today the patient is calm cooperative  Speech is clear.  Thought process: Linear  Thought content: Discharge focused, no delusional content elicited  Perception: Denies auditory hallucinations or other perceptual disturbances  Mood: Describes as "ok".   Affect: constricted, stable  Patient denies passive/active suicidal ideation, intention and plan.    Patient denies  aggressive/homicidal ideation, intent or plan.   Patient is alert and oriented .   Fund of knowledge is fair. Memory is improving.  Insight and judgment are improving.   Impulse control is intact at this time.

## 2023-05-15 NOTE — BH INPATIENT PSYCHIATRY PROGRESS NOTE - NSBHFUPINTERVALHXFT_PSY_A_CORE
Patient seen for follow up of psychosis.   Chart reviewed and case discussed with interdisciplinary team. No acute events overnight. No PRNs required/requested.   Today, patient reports feeling well and enjoying the nice weather. Patient is discharge focused. It has now been confirmed that his mother will now allow him to come home. Denies other complaints; denies paranoia, other delusions, AVH, SI/HI. Denies physical complaints or c/f medication issues or side effects.   Final details of discharge are being worked on.   No medication changes will be made.

## 2023-05-16 PROCEDURE — 99231 SBSQ HOSP IP/OBS SF/LOW 25: CPT

## 2023-05-16 RX ADMIN — DIVALPROEX SODIUM 1000 MILLIGRAM(S): 500 TABLET, DELAYED RELEASE ORAL at 20:18

## 2023-05-16 NOTE — BH INPATIENT PSYCHIATRY PROGRESS NOTE - NSBHFUPINTERVALHXFT_PSY_A_CORE
Patient seen for follow up of psychosis.   Chart reviewed and case discussed with interdisciplinary team.   Patient calmer and more cooperative  Remains discharge focused

## 2023-05-16 NOTE — BH DISCHARGE NOTE NURSING/SOCIAL WORK/PSYCH REHAB - NSBHDCAGENCY1FT_PSY_A_CORE
[facility] / bridge appointment

## 2023-05-16 NOTE — BH DISCHARGE NOTE NURSING/SOCIAL WORK/PSYCH REHAB - NSDCPRGOAL_PSY_ALL_CORE
Pt has demonstrated significant progress towards psychiatric rehabilitation goals during the current hospitalization. Pt is able to identify breathing, meditation, music, fresh air, and walking away from negative situations as healthy coping strategies to better manage symptoms. Pt endorses improvements in mood, sleep, appetite, and thought processing. Pt denies any current SI/HI, intent, or plan. Pt also denies any current AH/VH, or paranoia. Writer and pt engaged in safety planning, which can be reviewed in the  Safety Plan document. Pt was able to identify various warning signs, coping skills, and social supports to utilize after discharge. Pt has been compliant with medications and is tolerating them well. Pt is looking forward to discharge and is happy to be returning home. During the current hospitalization, pt was somewhat receptive to skill development. Pt attended approximately 60% of daily psychiatric rehabilitation groups. Pt was verbal in groups and participated more appropriately as his symptoms improved. Pt requires less redirection and is no longer a behavioral management issue on the unit. Pt has been visible in the milieu and demonstrates positive socialization with select peers. Pt is able to verbalize thoughts, needs, and feelings with encouragement. Pt demonstrates improving insight and judgement into symptoms and treatments. Pt was provided with a [employer] survey to complete prior to discharge.

## 2023-05-16 NOTE — BH INPATIENT PSYCHIATRY PROGRESS NOTE - NSBHATTESTCOMMENTATTENDFT_PSY_A_CORE
38 year old, male; domiciled with family (mom, dad, 2 brothers, 1 sister and brother-in-law); single (possible has a gf); no known dependents; unemployed on disability; PPH of schizoaffective vs bipolar with psychosis; multiple inpatient psychiatric hospitalizations (last discharged from [facility] ~mid-April, given Risperdal Consta); no known suicide attempts; h/o physically aggressive; active cannabis abuse and h/o ETOH abuse, no known history of complicated withdrawal; PMH childhood asthma; brought in by EMS; called by parents; for bizarre behavior and agitation in the context of noncompliance with treatment.     05/15  Clinical Update  Patient remains  focused on his discharge and now reports mom visited and will allow him to return home   With improvement in his thought process and behavior

## 2023-05-16 NOTE — BH INPATIENT PSYCHIATRY PROGRESS NOTE - NSBHATTESTTYPESTAFF_PSY_A_CORE
Resident/Student
Resident
Resident/Student
Resident

## 2023-05-16 NOTE — BH INPATIENT PSYCHIATRY PROGRESS NOTE - NSBHATTESTSTAFFAMEND_PSY_A_CORE
I have personally seen and examined this patient. I fully participated in the care of this patient. I have made amendments to the documentation where appropriate and otherwise agree with the history, physical exam, and plan as documented by the

## 2023-05-16 NOTE — BH DISCHARGE NOTE NURSING/SOCIAL WORK/PSYCH REHAB - NSBHDCADDR1FT_A_CORE
[address], [facility], [place] [id number] Your appointment is in-person at [phone], [place] [address]. Entrance on [address] and [address] will contact you with your appointment in the [facility] Adult Outpatient Dept.

## 2023-05-16 NOTE — BH INPATIENT PSYCHIATRY PROGRESS NOTE - MSE UNSTRUCTURED FT
On exam today the patient is cooperative  Speech is clear.  Thought process: Linear  Thought content: Discharge focused, no delusional content elicited  Perception: Denies auditory hallucinations or other perceptual disturbances  Mood: Describes as "ok".   Affect: constricted, stable  Patient denies passive/active suicidal ideation, intention and plan.    Patient denies  aggressive/homicidal ideation, intent or plan.   Patient is alert and oriented .   Fund of knowledge is fair. Memory is improving.  Insight and judgment are improving.   Impulse control is intact at this time.

## 2023-05-16 NOTE — BH DISCHARGE NOTE NURSING/SOCIAL WORK/PSYCH REHAB - DISCHARGE INSTRUCTIONS AFTERCARE APPOINTMENTS
In order to check the location, date, or time of your aftercare appointment, please refer to your Discharge Instructions Document given to you upon leaving the hospital.  If you have lost the instructions please call [phone]

## 2023-05-16 NOTE — BH DISCHARGE NOTE NURSING/SOCIAL WORK/PSYCH REHAB - PATIENT PORTAL LINK FT
You can access the FollowMyHealth Patient Portal offered by [facility] by registering at the following website: [email or web address]. By joining [employer]’s FollowMyHealth portal, you will also be able to view your health information using other applications (apps) compatible with our system.

## 2023-05-16 NOTE — BH DISCHARGE NOTE NURSING/SOCIAL WORK/PSYCH REHAB - NSCDUDCCRISIS_PSY_A_CORE
.National Suicide Prevention Lifeline 9 [phone]/.  [facility]  [phone]/.  [facility] Behavioral Health Helpline / [facility] Crisis  [phone])/.  [facility]  [phone] [place] [phone]   Hours:  Monday through Friday from 9 AM to 3 PM/988 Suicide and Crisis Lifeline

## 2023-05-17 VITALS — TEMPERATURE: 98 F | HEART RATE: 78 BPM | DIASTOLIC BLOOD PRESSURE: 66 MMHG | SYSTOLIC BLOOD PRESSURE: 115 MMHG

## 2023-05-17 PROCEDURE — 99231 SBSQ HOSP IP/OBS SF/LOW 25: CPT

## 2023-05-17 NOTE — BH INPATIENT PSYCHIATRY PROGRESS NOTE - NSCGISEVERILLNESS_PSY_ALL_CORE
3 = Mildly ill – clearly established symptoms with minimal, if any, distress or difficulty in social and occupational function
6 = Severely ill - disruptive pathology, behavior and function are frequently influenced by symptoms, may require assistance from others
5 = Markedly ill - intrusive symptoms that distinctly impair social/occupational function or cause intrusive levels of distress
5 = Markedly ill - intrusive symptoms that distinctly impair social/occupational function or cause intrusive levels of distress
6 = Severely ill - disruptive pathology, behavior and function are frequently influenced by symptoms, may require assistance from others
4 = Moderately ill – overt symptoms causing noticeable, but modest, functional impairment or distress; symptom level may warrant medication
6 = Severely ill - disruptive pathology, behavior and function are frequently influenced by symptoms, may require assistance from others
4 = Moderately ill – overt symptoms causing noticeable, but modest, functional impairment or distress; symptom level may warrant medication
6 = Severely ill - disruptive pathology, behavior and function are frequently influenced by symptoms, may require assistance from others
4 = Moderately ill – overt symptoms causing noticeable, but modest, functional impairment or distress; symptom level may warrant medication
6 = Severely ill - disruptive pathology, behavior and function are frequently influenced by symptoms, may require assistance from others
5 = Markedly ill - intrusive symptoms that distinctly impair social/occupational function or cause intrusive levels of distress
5 = Markedly ill - intrusive symptoms that distinctly impair social/occupational function or cause intrusive levels of distress
4 = Moderately ill – overt symptoms causing noticeable, but modest, functional impairment or distress; symptom level may warrant medication
6 = Severely ill - disruptive pathology, behavior and function are frequently influenced by symptoms, may require assistance from others
5 = Markedly ill - intrusive symptoms that distinctly impair social/occupational function or cause intrusive levels of distress

## 2023-05-17 NOTE — BH INPATIENT PSYCHIATRY PROGRESS NOTE - NSICDXBHSECONDARYDX_PSY_ALL_CORE
Cannabis abuse   F12.10  Post traumatic stress disorder (PTSD)   F43.10  

## 2023-05-17 NOTE — BH INPATIENT PSYCHIATRY PROGRESS NOTE - NSBHATTESTBILLING_PSY_A_CORE
95033-Nskbgldqyk OBS or IP - low complexity OR 25-34 mins
49250-Hbzmppygsd OBS or IP - low complexity OR 25-34 mins
68726-Oumraagnwc OBS or IP - low complexity OR 25-34 mins
45399-Uzvzbxyftf OBS or IP - low complexity OR 25-34 mins
16387-Lvszvjzibb OBS or IP - low complexity OR 25-34 mins
24861-Jkpowxkwbc OBS or IP - low complexity OR 25-34 mins
28850-Scgnpbnssm OBS or IP - low complexity OR 25-34 mins
07417-Aipyomwnsv OBS or IP - low complexity OR 25-34 mins
12464-Ddelcrclqp OBS or IP - low complexity OR 25-34 mins
87977-Noeyipxqju OBS or IP - low complexity OR 25-34 mins
38748-Qgcpoufann OBS or IP - low complexity OR 25-34 mins
50065-Yhdxhfjemd OBS or IP - low complexity OR 25-34 mins
62067-Drntadontj OBS or IP - moderate complexity OR 35-49 mins
01090-Eajvytnuzu OBS or IP - low complexity OR 25-34 mins
05271-Jytzpbkqtm OBS or IP - low complexity OR 25-34 mins
90172-Jjlnnoqrhk OBS or IP - low complexity OR 25-34 mins
92182-Dxybuuwtfe OBS or IP - low complexity OR 25-34 mins
41629-Kxyplmeehi OBS or IP - low complexity OR 25-34 mins

## 2023-05-17 NOTE — BH INPATIENT PSYCHIATRY PROGRESS NOTE - NSTXVIOLNTDATEEST_PSY_ALL_CORE
25-Apr-2023
17-May-2023

## 2023-05-17 NOTE — BH INPATIENT PSYCHIATRY PROGRESS NOTE - NSBHCHARTREVIEWVS_PSY_A_CORE FT
Vital Signs Last 24 Hrs  T(C): 36.4 (04-29-23 @ 08:34), Max: 36.4 (04-29-23 @ 08:34)  T(F): 97.6 (04-29-23 @ 08:34), Max: 97.6 (04-29-23 @ 08:34)  HR: --  BP: 110/83 (04-29-23 @ 08:34) (110/83 - 110/83)  BP(mean): 108 (04-29-23 @ 08:34) (108 - 108)  RR: --  SpO2: --    Orthostatic VS  04-27-23 @ 19:48  Lying BP: --/-- HR: --  Sitting BP: 130/76 HR: 84  Standing BP: --/-- HR: --  Site: --  Mode: --  
Vital Signs Last 24 Hrs  T(C): 36.2 (04-27-23 @ 06:26), Max: 36.2 (04-27-23 @ 06:26)  T(F): 97.2 (04-27-23 @ 06:26), Max: 97.2 (04-27-23 @ 06:26)  HR: 105 (04-27-23 @ 06:26) (105 - 105)  BP: 115/78 (04-27-23 @ 06:26) (115/78 - 115/78)  BP(mean): --  RR: --  SpO2: --    
Vital Signs Last 24 Hrs  T(C): 36.4 (05-05-23 @ 06:37), Max: 36.4 (05-05-23 @ 06:37)  T(F): 97.6 (05-05-23 @ 06:37), Max: 97.6 (05-05-23 @ 06:37)  HR: --  BP: --  BP(mean): --  RR: --  SpO2: --    Orthostatic VS  05-05-23 @ 06:37  Lying BP: --/-- HR: --  Sitting BP: 109/66 HR: 104  Standing BP: --/-- HR: --  Site: --  Mode: --  Orthostatic VS  05-04-23 @ 17:45  Lying BP: --/-- HR: --  Sitting BP: 127/62 HR: 101  Standing BP: --/-- HR: --  Site: --  Mode: --  
Vital Signs Last 24 Hrs  T(C): 36.5 (05-08-23 @ 08:44), Max: 36.5 (05-08-23 @ 08:44)  T(F): 97.7 (05-08-23 @ 08:44), Max: 97.7 (05-08-23 @ 08:44)  HR: 100 (05-08-23 @ 08:44) (100 - 100)  BP: 115/68 (05-08-23 @ 08:44) (115/68 - 115/68)  BP(mean): --  RR: --  SpO2: --    
Vital Signs Last 24 Hrs  T(C): --  T(F): --  HR: --  BP: --  BP(mean): --  RR: --  SpO2: --    Orthostatic VS  05-10-23 @ 07:57  Lying BP: --/-- HR: --  Sitting BP: 115/66 HR: 95  Standing BP: --/-- HR: --  Site: --  Mode: --  Orthostatic VS  05-09-23 @ 08:06  Lying BP: --/-- HR: --  Sitting BP: 119/66 HR: 97  Standing BP: --/-- HR: --  Site: --  Mode: --  
Vital Signs Last 24 Hrs  T(C): 36.1 (05-03-23 @ 08:57), Max: 36.1 (05-03-23 @ 08:57)  T(F): 96.9 (05-03-23 @ 08:57), Max: 96.9 (05-03-23 @ 08:57)  HR: --  BP: --  BP(mean): --  RR: --  SpO2: --    Orthostatic VS  05-03-23 @ 08:57  Lying BP: --/-- HR: --  Sitting BP: 138/73 HR: 114  Standing BP: 145/71 HR: 119  Site: --  Mode: --  
Vital Signs Last 24 Hrs  T(C): 36.8 (04-28-23 @ 07:43), Max: 36.8 (04-27-23 @ 19:48)  T(F): 98.2 (04-28-23 @ 07:43), Max: 98.3 (04-27-23 @ 19:48)  HR: 96 (04-28-23 @ 07:43) (96 - 96)  BP: 114/79 (04-28-23 @ 07:43) (114/79 - 114/79)  BP(mean): --  RR: --  SpO2: --    Orthostatic VS  04-27-23 @ 19:48  Lying BP: --/-- HR: --  Sitting BP: 130/76 HR: 84  Standing BP: --/-- HR: --  Site: --  Mode: --  
Vital Signs Last 24 Hrs  T(C): 36.2 (05-02-23 @ 06:24), Max: 36.2 (05-02-23 @ 06:24)  T(F): 97.2 (05-02-23 @ 06:24), Max: 97.2 (05-02-23 @ 06:24)  HR: 105 (05-02-23 @ 06:24) (105 - 105)  BP: 137/67 (05-02-23 @ 06:24) (137/67 - 137/67)  BP(mean): --  RR: --  SpO2: --    
Vital Signs Last 24 Hrs  T(C): 36.4 (05-09-23 @ 08:06), Max: 36.4 (05-09-23 @ 08:06)  T(F): 97.6 (05-09-23 @ 08:06), Max: 97.6 (05-09-23 @ 08:06)  HR: --  BP: --  BP(mean): --  RR: --  SpO2: --    Orthostatic VS  05-09-23 @ 08:06  Lying BP: --/-- HR: --  Sitting BP: 119/66 HR: 97  Standing BP: --/-- HR: --  Site: --  Mode: --  
Vital Signs Last 24 Hrs  T(C): 36.8 (05-11-23 @ 08:25), Max: 36.8 (05-11-23 @ 08:25)  T(F): 98.2 (05-11-23 @ 08:25), Max: 98.2 (05-11-23 @ 08:25)  HR: 69 (05-11-23 @ 08:25) (69 - 69)  BP: 109/66 (05-11-23 @ 08:25) (109/66 - 109/66)  BP(mean): --  RR: --  SpO2: --    Orthostatic VS  05-10-23 @ 07:57  Lying BP: --/-- HR: --  Sitting BP: 115/66 HR: 95  Standing BP: --/-- HR: --  Site: --  Mode: --  
Vital Signs Last 24 Hrs  T(C): 36.1 (04-26-23 @ 09:22), Max: 36.1 (04-26-23 @ 09:22)  T(F): 97 (04-26-23 @ 09:22), Max: 97 (04-26-23 @ 09:22)  HR: 100 (04-26-23 @ 09:22) (100 - 100)  BP: 102/58 (04-26-23 @ 09:22) (102/58 - 102/58)  BP(mean): --  RR: --  SpO2: --    Orthostatic VS  04-25-23 @ 08:24  Lying BP: --/-- HR: --  Sitting BP: 105/62 HR: --  Standing BP: 104/69 HR: --  Site: --  Mode: --  Orthostatic VS  04-24-23 @ 17:45  Lying BP: --/-- HR: --  Sitting BP: 113/74 HR: 93  Standing BP: 115/81 HR: 100  Site: --  Mode: --  
Vital Signs Last 24 Hrs  T(C): 36.1 (05-16-23 @ 08:08), Max: 36.1 (05-16-23 @ 08:08)  T(F): 96.9 (05-16-23 @ 08:08), Max: 96.9 (05-16-23 @ 08:08)  HR: --  BP: --  BP(mean): --  RR: --  SpO2: --    Orthostatic VS  05-16-23 @ 08:08  Lying BP: --/-- HR: --  Sitting BP: 120/63 HR: 66  Standing BP: --/-- HR: --  Site: --  Mode: --  
Vital Signs Last 24 Hrs  T(C): 36.6 (05-12-23 @ 08:13), Max: 36.6 (05-12-23 @ 08:13)  T(F): 97.8 (05-12-23 @ 08:13), Max: 97.8 (05-12-23 @ 08:13)  HR: 68 (05-12-23 @ 08:13) (68 - 68)  BP: 108/68 (05-12-23 @ 08:13) (108/68 - 108/68)  BP(mean): --  RR: --  SpO2: --    
Vital Signs Last 24 Hrs  T(C): 36.3 (04-30-23 @ 06:20), Max: 36.3 (04-30-23 @ 06:20)  T(F): 97.4 (04-30-23 @ 06:20), Max: 97.4 (04-30-23 @ 06:20)  HR: 116 (04-30-23 @ 06:20) (116 - 116)  BP: 116/80 (04-30-23 @ 06:20) (116/80 - 116/80)  BP(mean): --  RR: --  SpO2: --    
Vital Signs Last 24 Hrs  T(C): 36.4 (05-15-23 @ 08:23), Max: 36.4 (05-15-23 @ 08:23)  T(F): 97.6 (05-15-23 @ 08:23), Max: 97.6 (05-15-23 @ 08:23)  HR: 68 (05-15-23 @ 08:23) (68 - 68)  BP: 116/70 (05-15-23 @ 08:23) (116/70 - 116/70)  BP(mean): --  RR: --  SpO2: --    
Vital Signs Last 24 Hrs  T(C): 36.3 (05-01-23 @ 06:33), Max: 36.3 (05-01-23 @ 06:33)  T(F): 97.4 (05-01-23 @ 06:33), Max: 97.4 (05-01-23 @ 06:33)  HR: 108 (05-01-23 @ 06:33) (108 - 108)  BP: 100/67 (05-01-23 @ 06:33) (100/67 - 100/67)  BP(mean): --  RR: --  SpO2: --    
Vital Signs Last 24 Hrs  T(C): 36.9 (05-17-23 @ 08:43), Max: 36.9 (05-17-23 @ 08:43)  T(F): 98.5 (05-17-23 @ 08:43), Max: 98.5 (05-17-23 @ 08:43)  HR: 78 (05-17-23 @ 08:43) (78 - 78)  BP: 115/66 (05-17-23 @ 08:43) (115/66 - 115/66)  BP(mean): --  RR: --  SpO2: --    Orthostatic VS  05-16-23 @ 08:08  Lying BP: --/-- HR: --  Sitting BP: 120/63 HR: 66  Standing BP: --/-- HR: --  Site: --  Mode: --  
Vital Signs Last 24 Hrs  T(C): 36.4 (05-04-23 @ 08:00), Max: 36.4 (05-04-23 @ 08:00)  T(F): 97.6 (05-04-23 @ 08:00), Max: 97.6 (05-04-23 @ 08:00)  HR: 116 (05-04-23 @ 08:00) (116 - 116)  BP: 111/69 (05-04-23 @ 08:00) (111/69 - 111/69)  BP(mean): --  RR: --  SpO2: --    Orthostatic VS  05-03-23 @ 08:57  Lying BP: --/-- HR: --  Sitting BP: 138/73 HR: 114  Standing BP: 145/71 HR: 119  Site: --  Mode: --

## 2023-05-17 NOTE — BH INPATIENT PSYCHIATRY PROGRESS NOTE - PRN MEDS
MEDICATIONS  (PRN):  acetaminophen     Tablet .. 650 milliGRAM(s) Oral every 6 hours PRN Moderate Pain (4 - 6)  haloperidol     Tablet 5 milliGRAM(s) Oral every 6 hours PRN agitation  haloperidol    Injectable 5 milliGRAM(s) IntraMuscular Once PRN agitation  LORazepam     Tablet 2 milliGRAM(s) Oral every 6 hours PRN Agitation  LORazepam   Injectable 2 milliGRAM(s) IntraMuscular Once PRN Agitation  nicotine  Polacrilex Gum 2 milliGRAM(s) Oral every 2 hours PRN nicotine cravings  
MEDICATIONS  (PRN):  acetaminophen     Tablet .. 650 milliGRAM(s) Oral every 6 hours PRN Moderate Pain (4 - 6)  haloperidol     Tablet 5 milliGRAM(s) Oral every 6 hours PRN agitation  haloperidol    Injectable 5 milliGRAM(s) IntraMuscular Once PRN agitation  loratadine 10 milliGRAM(s) Oral daily PRN allergies  LORazepam     Tablet 2 milliGRAM(s) Oral every 6 hours PRN Agitation  LORazepam   Injectable 2 milliGRAM(s) IntraMuscular Once PRN Agitation  nicotine  Polacrilex Gum 2 milliGRAM(s) Oral every 2 hours PRN nicotine cravings  
MEDICATIONS  (PRN):  acetaminophen     Tablet .. 650 milliGRAM(s) Oral every 6 hours PRN Moderate Pain (4 - 6)  haloperidol     Tablet 5 milliGRAM(s) Oral every 6 hours PRN agitation  haloperidol    Injectable 5 milliGRAM(s) IntraMuscular Once PRN agitation  loratadine 10 milliGRAM(s) Oral daily PRN allergies  nicotine  Polacrilex Gum 2 milliGRAM(s) Oral every 2 hours PRN nicotine cravings  traZODone 50 milliGRAM(s) Oral at bedtime PRN Insomnia  
MEDICATIONS  (PRN):  acetaminophen     Tablet .. 650 milliGRAM(s) Oral every 6 hours PRN Moderate Pain (4 - 6)  diphenhydrAMINE 50 milliGRAM(s) Oral every 6 hours PRN agitation  haloperidol     Tablet 5 milliGRAM(s) Oral every 6 hours PRN agitation  haloperidol    Injectable 5 milliGRAM(s) IntraMuscular Once PRN agitation  loratadine 10 milliGRAM(s) Oral daily PRN allergies  LORazepam     Tablet 2 milliGRAM(s) Oral every 6 hours PRN agitation  nicotine  Polacrilex Gum 2 milliGRAM(s) Oral every 2 hours PRN nicotine cravings  traZODone 50 milliGRAM(s) Oral at bedtime PRN Insomnia  
MEDICATIONS  (PRN):  acetaminophen     Tablet .. 650 milliGRAM(s) Oral every 6 hours PRN Moderate Pain (4 - 6)  haloperidol     Tablet 5 milliGRAM(s) Oral every 6 hours PRN agitation  haloperidol    Injectable 5 milliGRAM(s) IntraMuscular Once PRN agitation  loratadine 10 milliGRAM(s) Oral daily PRN allergies  LORazepam     Tablet 2 milliGRAM(s) Oral every 6 hours PRN Agitation  LORazepam   Injectable 2 milliGRAM(s) IntraMuscular Once PRN Agitation  nicotine  Polacrilex Gum 2 milliGRAM(s) Oral every 2 hours PRN nicotine cravings  traZODone 50 milliGRAM(s) Oral at bedtime PRN Insomnia  
MEDICATIONS  (PRN):  acetaminophen     Tablet .. 650 milliGRAM(s) Oral every 6 hours PRN Moderate Pain (4 - 6)  haloperidol     Tablet 5 milliGRAM(s) Oral every 6 hours PRN agitation  haloperidol    Injectable 5 milliGRAM(s) IntraMuscular Once PRN agitation  loratadine 10 milliGRAM(s) Oral daily PRN allergies  LORazepam     Tablet 2 milliGRAM(s) Oral every 6 hours PRN Agitation  LORazepam   Injectable 2 milliGRAM(s) IntraMuscular Once PRN Agitation  nicotine  Polacrilex Gum 2 milliGRAM(s) Oral every 2 hours PRN nicotine cravings  traZODone 50 milliGRAM(s) Oral at bedtime PRN Insomnia  
MEDICATIONS  (PRN):  acetaminophen     Tablet .. 650 milliGRAM(s) Oral every 6 hours PRN Moderate Pain (4 - 6)  diphenhydrAMINE 50 milliGRAM(s) Oral every 6 hours PRN agitation  haloperidol     Tablet 5 milliGRAM(s) Oral every 6 hours PRN agitation  haloperidol    Injectable 5 milliGRAM(s) IntraMuscular Once PRN agitation  loratadine 10 milliGRAM(s) Oral daily PRN allergies  LORazepam     Tablet 2 milliGRAM(s) Oral every 6 hours PRN agitation  nicotine  Polacrilex Gum 2 milliGRAM(s) Oral every 2 hours PRN nicotine cravings  traZODone 50 milliGRAM(s) Oral at bedtime PRN Insomnia  
MEDICATIONS  (PRN):  acetaminophen     Tablet .. 650 milliGRAM(s) Oral every 6 hours PRN Moderate Pain (4 - 6)  diphenhydrAMINE 50 milliGRAM(s) Oral every 6 hours PRN agitation  haloperidol     Tablet 5 milliGRAM(s) Oral every 6 hours PRN agitation  haloperidol    Injectable 5 milliGRAM(s) IntraMuscular Once PRN agitation  loratadine 10 milliGRAM(s) Oral daily PRN allergies  LORazepam     Tablet 2 milliGRAM(s) Oral every 6 hours PRN agitation  nicotine  Polacrilex Gum 2 milliGRAM(s) Oral every 2 hours PRN nicotine cravings  traZODone 50 milliGRAM(s) Oral at bedtime PRN Insomnia  
MEDICATIONS  (PRN):  acetaminophen     Tablet .. 650 milliGRAM(s) Oral every 6 hours PRN Moderate Pain (4 - 6)  haloperidol     Tablet 5 milliGRAM(s) Oral every 6 hours PRN agitation  haloperidol    Injectable 5 milliGRAM(s) IntraMuscular Once PRN agitation  LORazepam     Tablet 2 milliGRAM(s) Oral every 6 hours PRN Agitation  LORazepam   Injectable 2 milliGRAM(s) IntraMuscular Once PRN Agitation  nicotine  Polacrilex Gum 2 milliGRAM(s) Oral every 2 hours PRN nicotine cravings  
MEDICATIONS  (PRN):  acetaminophen     Tablet .. 650 milliGRAM(s) Oral every 6 hours PRN Moderate Pain (4 - 6)  haloperidol     Tablet 5 milliGRAM(s) Oral every 6 hours PRN agitation  haloperidol    Injectable 5 milliGRAM(s) IntraMuscular Once PRN agitation  LORazepam     Tablet 2 milliGRAM(s) Oral every 6 hours PRN Agitation  LORazepam   Injectable 2 milliGRAM(s) IntraMuscular Once PRN Agitation  nicotine  Polacrilex Gum 2 milliGRAM(s) Oral every 2 hours PRN nicotine cravings  
MEDICATIONS  (PRN):  acetaminophen     Tablet .. 650 milliGRAM(s) Oral every 6 hours PRN Moderate Pain (4 - 6)  diphenhydrAMINE 50 milliGRAM(s) Oral every 6 hours PRN agitation  haloperidol     Tablet 5 milliGRAM(s) Oral every 6 hours PRN agitation  haloperidol    Injectable 5 milliGRAM(s) IntraMuscular Once PRN agitation  loratadine 10 milliGRAM(s) Oral daily PRN allergies  LORazepam     Tablet 2 milliGRAM(s) Oral every 6 hours PRN agitation  nicotine  Polacrilex Gum 2 milliGRAM(s) Oral every 2 hours PRN nicotine cravings  traZODone 50 milliGRAM(s) Oral at bedtime PRN Insomnia

## 2023-05-17 NOTE — BH INPATIENT PSYCHIATRY PROGRESS NOTE - NSBHMETABOLIC_PSY_ALL_CORE_FT
BMI: BMI (kg/m2): 18.1 (04-24-23 @ 17:45)  HbA1c: A1C with Estimated Average Glucose Result: 5.4 % (04-25-23 @ 08:21)    Glucose: POCT Blood Glucose.: 111 mg/dL (04-24-23 @ 10:25)    BP: 116/70 (05-15-23 @ 08:23) (111/58 - 116/70)  Lipid Panel: Date/Time: 04-25-23 @ 08:21  Cholesterol, Serum: 160  Direct LDL: --  HDL Cholesterol, Serum: 100  Total Cholesterol/HDL Ration Measurement: --  Triglycerides, Serum: 32  
BMI: BMI (kg/m2): 18.1 (04-24-23 @ 17:45)  HbA1c: A1C with Estimated Average Glucose Result: 5.4 % (04-25-23 @ 08:21)    Glucose: POCT Blood Glucose.: 111 mg/dL (04-24-23 @ 10:25)    BP: 108/68 (05-12-23 @ 08:13) (108/68 - 109/66)  Lipid Panel: Date/Time: 04-25-23 @ 08:21  Cholesterol, Serum: 160  Direct LDL: --  HDL Cholesterol, Serum: 100  Total Cholesterol/HDL Ration Measurement: --  Triglycerides, Serum: 32  
BMI: BMI (kg/m2): 18.1 (04-24-23 @ 17:45)  HbA1c: A1C with Estimated Average Glucose Result: 5.4 % (04-25-23 @ 08:21)    Glucose: POCT Blood Glucose.: 111 mg/dL (04-24-23 @ 10:25)    BP: 116/80 (04-30-23 @ 06:20) (110/83 - 116/80)  Lipid Panel: Date/Time: 04-25-23 @ 08:21  Cholesterol, Serum: 160  Direct LDL: --  HDL Cholesterol, Serum: 100  Total Cholesterol/HDL Ration Measurement: --  Triglycerides, Serum: 32  
BMI: BMI (kg/m2): 18.1 (04-24-23 @ 17:45)  HbA1c: A1C with Estimated Average Glucose Result: 5.4 % (04-25-23 @ 08:21)    Glucose: POCT Blood Glucose.: 111 mg/dL (04-24-23 @ 10:25)    BP: 111/69 (05-04-23 @ 08:00) (111/69 - 137/67)  Lipid Panel: Date/Time: 04-25-23 @ 08:21  Cholesterol, Serum: 160  Direct LDL: --  HDL Cholesterol, Serum: 100  Total Cholesterol/HDL Ration Measurement: --  Triglycerides, Serum: 32  
BMI: BMI (kg/m2): 18.1 (04-24-23 @ 17:45)  HbA1c: A1C with Estimated Average Glucose Result: 5.4 % (04-25-23 @ 08:21)    Glucose: POCT Blood Glucose.: 111 mg/dL (04-24-23 @ 10:25)    BP: 137/67 (05-02-23 @ 06:24) (100/67 - 137/67)  Lipid Panel: Date/Time: 04-25-23 @ 08:21  Cholesterol, Serum: 160  Direct LDL: --  HDL Cholesterol, Serum: 100  Total Cholesterol/HDL Ration Measurement: --  Triglycerides, Serum: 32  
BMI: BMI (kg/m2): 18.1 (04-24-23 @ 17:45)  HbA1c: A1C with Estimated Average Glucose Result: 5.4 % (04-25-23 @ 08:21)    Glucose: POCT Blood Glucose.: 111 mg/dL (04-24-23 @ 10:25)    BP: 115/78 (04-27-23 @ 06:26) (102/58 - 115/78)  Lipid Panel: Date/Time: 04-25-23 @ 08:21  Cholesterol, Serum: 160  Direct LDL: --  HDL Cholesterol, Serum: 100  Total Cholesterol/HDL Ration Measurement: --  Triglycerides, Serum: 32  
BMI: BMI (kg/m2): 18.1 (04-24-23 @ 17:45)  HbA1c: A1C with Estimated Average Glucose Result: 5.4 % (04-25-23 @ 08:21)    Glucose: POCT Blood Glucose.: 111 mg/dL (04-24-23 @ 10:25)    BP: 102/58 (04-26-23 @ 09:22) (102/58 - 148/67)  Lipid Panel: Date/Time: 04-25-23 @ 08:21  Cholesterol, Serum: 160  Direct LDL: --  HDL Cholesterol, Serum: 100  Total Cholesterol/HDL Ration Measurement: --  Triglycerides, Serum: 32  
BMI: BMI (kg/m2): 18.1 (04-24-23 @ 17:45)  HbA1c: A1C with Estimated Average Glucose Result: 5.4 % (04-25-23 @ 08:21)    Glucose: POCT Blood Glucose.: 111 mg/dL (04-24-23 @ 10:25)    BP: 115/68 (05-08-23 @ 08:44) (105/66 - 115/68)  Lipid Panel: Date/Time: 04-25-23 @ 08:21  Cholesterol, Serum: 160  Direct LDL: --  HDL Cholesterol, Serum: 100  Total Cholesterol/HDL Ration Measurement: --  Triglycerides, Serum: 32  
BMI: BMI (kg/m2): 18.1 (04-24-23 @ 17:45)  HbA1c: A1C with Estimated Average Glucose Result: 5.4 % (04-25-23 @ 08:21)    Glucose: POCT Blood Glucose.: 111 mg/dL (04-24-23 @ 10:25)    BP: 137/67 (05-02-23 @ 06:24) (100/67 - 137/67)  Lipid Panel: Date/Time: 04-25-23 @ 08:21  Cholesterol, Serum: 160  Direct LDL: --  HDL Cholesterol, Serum: 100  Total Cholesterol/HDL Ration Measurement: --  Triglycerides, Serum: 32  
BMI: BMI (kg/m2): 18.1 (04-24-23 @ 17:45)  HbA1c: A1C with Estimated Average Glucose Result: 5.4 % (04-25-23 @ 08:21)    Glucose: POCT Blood Glucose.: 111 mg/dL (04-24-23 @ 10:25)    BP: 110/83 (04-29-23 @ 08:34) (110/83 - 115/78)  Lipid Panel: Date/Time: 04-25-23 @ 08:21  Cholesterol, Serum: 160  Direct LDL: --  HDL Cholesterol, Serum: 100  Total Cholesterol/HDL Ration Measurement: --  Triglycerides, Serum: 32  
BMI: BMI (kg/m2): 18.1 (04-24-23 @ 17:45)  HbA1c: A1C with Estimated Average Glucose Result: 5.4 % (04-25-23 @ 08:21)    Glucose: POCT Blood Glucose.: 111 mg/dL (04-24-23 @ 10:25)    BP: 109/66 (05-11-23 @ 08:25) (109/66 - 109/66)  Lipid Panel: Date/Time: 04-25-23 @ 08:21  Cholesterol, Serum: 160  Direct LDL: --  HDL Cholesterol, Serum: 100  Total Cholesterol/HDL Ration Measurement: --  Triglycerides, Serum: 32  
BMI: BMI (kg/m2): 18.1 (04-24-23 @ 17:45)  HbA1c: A1C with Estimated Average Glucose Result: 5.4 % (04-25-23 @ 08:21)    Glucose: POCT Blood Glucose.: 111 mg/dL (04-24-23 @ 10:25)    BP: 111/69 (05-04-23 @ 08:00) (111/69 - 111/69)  Lipid Panel: Date/Time: 04-25-23 @ 08:21  Cholesterol, Serum: 160  Direct LDL: --  HDL Cholesterol, Serum: 100  Total Cholesterol/HDL Ration Measurement: --  Triglycerides, Serum: 32  
BMI: BMI (kg/m2): 18.1 (04-24-23 @ 17:45)  HbA1c: A1C with Estimated Average Glucose Result: 5.4 % (04-25-23 @ 08:21)    Glucose: POCT Blood Glucose.: 111 mg/dL (04-24-23 @ 10:25)    BP: 100/67 (05-01-23 @ 06:33) (100/67 - 116/80)  Lipid Panel: Date/Time: 04-25-23 @ 08:21  Cholesterol, Serum: 160  Direct LDL: --  HDL Cholesterol, Serum: 100  Total Cholesterol/HDL Ration Measurement: --  Triglycerides, Serum: 32  
BMI: BMI (kg/m2): 18.1 (04-24-23 @ 17:45)  HbA1c: A1C with Estimated Average Glucose Result: 5.4 % (04-25-23 @ 08:21)    Glucose: POCT Blood Glucose.: 111 mg/dL (04-24-23 @ 10:25)    BP: 115/68 (05-08-23 @ 08:44) (115/68 - 115/68)  Lipid Panel: Date/Time: 04-25-23 @ 08:21  Cholesterol, Serum: 160  Direct LDL: --  HDL Cholesterol, Serum: 100  Total Cholesterol/HDL Ration Measurement: --  Triglycerides, Serum: 32  
BMI: BMI (kg/m2): 18.1 (04-24-23 @ 17:45)  HbA1c: A1C with Estimated Average Glucose Result: 5.4 % (04-25-23 @ 08:21)    Glucose: POCT Blood Glucose.: 111 mg/dL (04-24-23 @ 10:25)    BP: 115/68 (05-08-23 @ 08:44) (105/66 - 115/80)  Lipid Panel: Date/Time: 04-25-23 @ 08:21  Cholesterol, Serum: 160  Direct LDL: --  HDL Cholesterol, Serum: 100  Total Cholesterol/HDL Ration Measurement: --  Triglycerides, Serum: 32  
BMI: BMI (kg/m2): 18.1 (04-24-23 @ 17:45)  HbA1c: A1C with Estimated Average Glucose Result: 5.4 % (04-25-23 @ 08:21)    Glucose: POCT Blood Glucose.: 111 mg/dL (04-24-23 @ 10:25)    BP: 114/79 (04-28-23 @ 07:43) (102/58 - 115/78)  Lipid Panel: Date/Time: 04-25-23 @ 08:21  Cholesterol, Serum: 160  Direct LDL: --  HDL Cholesterol, Serum: 100  Total Cholesterol/HDL Ration Measurement: --  Triglycerides, Serum: 32  
BMI: BMI (kg/m2): 18.1 (04-24-23 @ 17:45)  HbA1c: A1C with Estimated Average Glucose Result: 5.4 % (04-25-23 @ 08:21)    Glucose: POCT Blood Glucose.: 111 mg/dL (04-24-23 @ 10:25)    BP: 115/66 (05-17-23 @ 08:43) (115/66 - 116/70)  Lipid Panel: Date/Time: 04-25-23 @ 08:21  Cholesterol, Serum: 160  Direct LDL: --  HDL Cholesterol, Serum: 100  Total Cholesterol/HDL Ration Measurement: --  Triglycerides, Serum: 32  
BMI: BMI (kg/m2): 18.1 (04-24-23 @ 17:45)  HbA1c: A1C with Estimated Average Glucose Result: 5.4 % (04-25-23 @ 08:21)    Glucose: POCT Blood Glucose.: 111 mg/dL (04-24-23 @ 10:25)    BP: 116/70 (05-15-23 @ 08:23) (111/58 - 116/70)  Lipid Panel: Date/Time: 04-25-23 @ 08:21  Cholesterol, Serum: 160  Direct LDL: --  HDL Cholesterol, Serum: 100  Total Cholesterol/HDL Ration Measurement: --  Triglycerides, Serum: 32

## 2023-05-17 NOTE — BH SCALES AND SCREENS - NSBPRSSUSPIC_PSY_ALL_CORE
2 = Very Mild – rare instances of distrustfulness which may or may not be warranted by the situation
5 = Moderately Severe – pervasive suspiciousness, or frequent ideas of reference
4 = Moderate – more frequent suspiciousness, or transient ideas of reference

## 2023-05-17 NOTE — BH INPATIENT PSYCHIATRY PROGRESS NOTE - NSTXMEDICDATETRGT_PSY_ALL_CORE
16-May-2023
02-May-2023
16-May-2023
16-May-2023
02-May-2023
24-May-2023
02-May-2023
16-May-2023
16-May-2023
02-May-2023

## 2023-05-17 NOTE — BH INPATIENT PSYCHIATRY PROGRESS NOTE - NSBHCONSULTIPREASON_PSY_A_CORE
danger to others; mental illness expected to respond to inpatient care

## 2023-05-17 NOTE — BH INPATIENT PSYCHIATRY PROGRESS NOTE - NSTXMEDICGOAL_PSY_ALL_CORE
Take all medications as prescribed

## 2023-05-17 NOTE — BH INPATIENT PSYCHIATRY PROGRESS NOTE - NSTXDCOPNODATEEST_PSY_ALL_CORE
25-Apr-2023
02-May-2023
25-Apr-2023
02-May-2023
25-Apr-2023
02-May-2023
25-Apr-2023
02-May-2023

## 2023-05-17 NOTE — BH INPATIENT PSYCHIATRY PROGRESS NOTE - MSE OPTIONS
Unstructured MSE
Structured MSE
Unstructured MSE

## 2023-05-17 NOTE — BH INPATIENT PSYCHIATRY PROGRESS NOTE - NSTXPSYCHODATEEST_PSY_ALL_CORE
26-Apr-2023
03-May-2023
26-Apr-2023
03-May-2023
26-Apr-2023
26-Apr-2023
17-May-2023
03-May-2023
03-May-2023
26-Apr-2023
03-May-2023

## 2023-05-17 NOTE — BH INPATIENT PSYCHIATRY PROGRESS NOTE - NSTXPROBMEDIC_PSY_ALL_CORE
MEDICATION/TREATMENT NON-COMPLIANCE

## 2023-05-17 NOTE — BH INPATIENT PSYCHIATRY PROGRESS NOTE - NSCGIIMPROVESX_PSY_ALL_CORE
3 = Minimally improved - slightly better with little or no clinically meaningful reduction of symptoms.  Represents very little change in basic clinical status, level of care, or functional capacity.
5 = Minimally worse - slightly worse but may not be clinically meaningful; may represent very little change in basic clinical status or functional capacity
3 = Minimally improved - slightly better with little or no clinically meaningful reduction of symptoms.  Represents very little change in basic clinical status, level of care, or functional capacity.
3 = Minimally improved - slightly better with little or no clinically meaningful reduction of symptoms.  Represents very little change in basic clinical status, level of care, or functional capacity.
4 = No change - symptoms remain essentially unchanged
2 = Much improved - notably better with signficant reduction of symptoms; increase in the level of functioning but some symptoms remain
4 = No change - symptoms remain essentially unchanged
4 = No change - symptoms remain essentially unchanged
5 = Minimally worse - slightly worse but may not be clinically meaningful; may represent very little change in basic clinical status or functional capacity
3 = Minimally improved - slightly better with little or no clinically meaningful reduction of symptoms.  Represents very little change in basic clinical status, level of care, or functional capacity.
4 = No change - symptoms remain essentially unchanged
3 = Minimally improved - slightly better with little or no clinically meaningful reduction of symptoms.  Represents very little change in basic clinical status, level of care, or functional capacity.
4 = No change - symptoms remain essentially unchanged
5 = Minimally worse - slightly worse but may not be clinically meaningful; may represent very little change in basic clinical status or functional capacity
5 = Minimally worse - slightly worse but may not be clinically meaningful; may represent very little change in basic clinical status or functional capacity
3 = Minimally improved - slightly better with little or no clinically meaningful reduction of symptoms.  Represents very little change in basic clinical status, level of care, or functional capacity.

## 2023-05-17 NOTE — BH INPATIENT PSYCHIATRY PROGRESS NOTE - NSTXVIOLNTINTERMD_PSY_ALL_CORE
medications and psychotherapy

## 2023-05-17 NOTE — BH INPATIENT PSYCHIATRY PROGRESS NOTE - CURRENT MEDICATION
MEDICATIONS  (STANDING):  divalproex  milliGRAM(s) Oral at bedtime  risperiDONE   Tablet 2 milliGRAM(s) Oral at bedtime    MEDICATIONS  (PRN):  acetaminophen     Tablet .. 650 milliGRAM(s) Oral every 6 hours PRN Moderate Pain (4 - 6)  haloperidol     Tablet 5 milliGRAM(s) Oral every 6 hours PRN agitation  haloperidol    Injectable 5 milliGRAM(s) IntraMuscular Once PRN agitation  LORazepam     Tablet 2 milliGRAM(s) Oral every 6 hours PRN Agitation  LORazepam   Injectable 2 milliGRAM(s) IntraMuscular Once PRN Agitation  nicotine  Polacrilex Gum 2 milliGRAM(s) Oral every 2 hours PRN nicotine cravings  
MEDICATIONS  (STANDING):  divalproex DR 1000 milliGRAM(s) Oral at bedtime    MEDICATIONS  (PRN):  acetaminophen     Tablet .. 650 milliGRAM(s) Oral every 6 hours PRN Moderate Pain (4 - 6)  diphenhydrAMINE 50 milliGRAM(s) Oral every 6 hours PRN agitation  haloperidol     Tablet 5 milliGRAM(s) Oral every 6 hours PRN agitation  haloperidol    Injectable 5 milliGRAM(s) IntraMuscular Once PRN agitation  loratadine 10 milliGRAM(s) Oral daily PRN allergies  LORazepam     Tablet 2 milliGRAM(s) Oral every 6 hours PRN agitation  nicotine  Polacrilex Gum 2 milliGRAM(s) Oral every 2 hours PRN nicotine cravings  traZODone 50 milliGRAM(s) Oral at bedtime PRN Insomnia  
MEDICATIONS  (STANDING):  divalproex  milliGRAM(s) Oral at bedtime  risperiDONE   Tablet 2 milliGRAM(s) Oral at bedtime    MEDICATIONS  (PRN):  acetaminophen     Tablet .. 650 milliGRAM(s) Oral every 6 hours PRN Moderate Pain (4 - 6)  haloperidol     Tablet 5 milliGRAM(s) Oral every 6 hours PRN agitation  haloperidol    Injectable 5 milliGRAM(s) IntraMuscular Once PRN agitation  LORazepam     Tablet 2 milliGRAM(s) Oral every 6 hours PRN Agitation  LORazepam   Injectable 2 milliGRAM(s) IntraMuscular Once PRN Agitation  nicotine  Polacrilex Gum 2 milliGRAM(s) Oral every 2 hours PRN nicotine cravings  
MEDICATIONS  (STANDING):  divalproex DR 1000 milliGRAM(s) Oral at bedtime    MEDICATIONS  (PRN):  acetaminophen     Tablet .. 650 milliGRAM(s) Oral every 6 hours PRN Moderate Pain (4 - 6)  diphenhydrAMINE 50 milliGRAM(s) Oral every 6 hours PRN agitation  haloperidol     Tablet 5 milliGRAM(s) Oral every 6 hours PRN agitation  haloperidol    Injectable 5 milliGRAM(s) IntraMuscular Once PRN agitation  loratadine 10 milliGRAM(s) Oral daily PRN allergies  LORazepam     Tablet 2 milliGRAM(s) Oral every 6 hours PRN agitation  nicotine  Polacrilex Gum 2 milliGRAM(s) Oral every 2 hours PRN nicotine cravings  traZODone 50 milliGRAM(s) Oral at bedtime PRN Insomnia  
MEDICATIONS  (STANDING):  divalproex DR 1000 milliGRAM(s) Oral at bedtime    MEDICATIONS  (PRN):  acetaminophen     Tablet .. 650 milliGRAM(s) Oral every 6 hours PRN Moderate Pain (4 - 6)  diphenhydrAMINE 50 milliGRAM(s) Oral every 6 hours PRN agitation  haloperidol     Tablet 5 milliGRAM(s) Oral every 6 hours PRN agitation  haloperidol    Injectable 5 milliGRAM(s) IntraMuscular Once PRN agitation  loratadine 10 milliGRAM(s) Oral daily PRN allergies  LORazepam     Tablet 2 milliGRAM(s) Oral every 6 hours PRN agitation  nicotine  Polacrilex Gum 2 milliGRAM(s) Oral every 2 hours PRN nicotine cravings  traZODone 50 milliGRAM(s) Oral at bedtime PRN Insomnia  
MEDICATIONS  (STANDING):  divalproex DR 1000 milliGRAM(s) Oral at bedtime  risperiDONE   Tablet 2 milliGRAM(s) Oral at bedtime    MEDICATIONS  (PRN):  acetaminophen     Tablet .. 650 milliGRAM(s) Oral every 6 hours PRN Moderate Pain (4 - 6)  haloperidol     Tablet 5 milliGRAM(s) Oral every 6 hours PRN agitation  haloperidol    Injectable 5 milliGRAM(s) IntraMuscular Once PRN agitation  loratadine 10 milliGRAM(s) Oral daily PRN allergies  LORazepam     Tablet 2 milliGRAM(s) Oral every 6 hours PRN Agitation  LORazepam   Injectable 2 milliGRAM(s) IntraMuscular Once PRN Agitation  nicotine  Polacrilex Gum 2 milliGRAM(s) Oral every 2 hours PRN nicotine cravings  traZODone 50 milliGRAM(s) Oral at bedtime PRN Insomnia  
MEDICATIONS  (STANDING):  divalproex DR 1000 milliGRAM(s) Oral at bedtime    MEDICATIONS  (PRN):  acetaminophen     Tablet .. 650 milliGRAM(s) Oral every 6 hours PRN Moderate Pain (4 - 6)  diphenhydrAMINE 50 milliGRAM(s) Oral every 6 hours PRN agitation  haloperidol     Tablet 5 milliGRAM(s) Oral every 6 hours PRN agitation  haloperidol    Injectable 5 milliGRAM(s) IntraMuscular Once PRN agitation  loratadine 10 milliGRAM(s) Oral daily PRN allergies  LORazepam     Tablet 2 milliGRAM(s) Oral every 6 hours PRN agitation  nicotine  Polacrilex Gum 2 milliGRAM(s) Oral every 2 hours PRN nicotine cravings  traZODone 50 milliGRAM(s) Oral at bedtime PRN Insomnia  
MEDICATIONS  (STANDING):  divalproex DR 1000 milliGRAM(s) Oral at bedtime    MEDICATIONS  (PRN):  acetaminophen     Tablet .. 650 milliGRAM(s) Oral every 6 hours PRN Moderate Pain (4 - 6)  haloperidol     Tablet 5 milliGRAM(s) Oral every 6 hours PRN agitation  haloperidol    Injectable 5 milliGRAM(s) IntraMuscular Once PRN agitation  loratadine 10 milliGRAM(s) Oral daily PRN allergies  nicotine  Polacrilex Gum 2 milliGRAM(s) Oral every 2 hours PRN nicotine cravings  traZODone 50 milliGRAM(s) Oral at bedtime PRN Insomnia  
MEDICATIONS  (STANDING):  divalproex DR 1000 milliGRAM(s) Oral at bedtime    MEDICATIONS  (PRN):  acetaminophen     Tablet .. 650 milliGRAM(s) Oral every 6 hours PRN Moderate Pain (4 - 6)  haloperidol     Tablet 5 milliGRAM(s) Oral every 6 hours PRN agitation  haloperidol    Injectable 5 milliGRAM(s) IntraMuscular Once PRN agitation  loratadine 10 milliGRAM(s) Oral daily PRN allergies  LORazepam     Tablet 2 milliGRAM(s) Oral every 6 hours PRN Agitation  LORazepam   Injectable 2 milliGRAM(s) IntraMuscular Once PRN Agitation  nicotine  Polacrilex Gum 2 milliGRAM(s) Oral every 2 hours PRN nicotine cravings  traZODone 50 milliGRAM(s) Oral at bedtime PRN Insomnia  
MEDICATIONS  (STANDING):  divalproex DR 1000 milliGRAM(s) Oral at bedtime    MEDICATIONS  (PRN):  acetaminophen     Tablet .. 650 milliGRAM(s) Oral every 6 hours PRN Moderate Pain (4 - 6)  diphenhydrAMINE 50 milliGRAM(s) Oral every 6 hours PRN agitation  haloperidol     Tablet 5 milliGRAM(s) Oral every 6 hours PRN agitation  haloperidol    Injectable 5 milliGRAM(s) IntraMuscular Once PRN agitation  loratadine 10 milliGRAM(s) Oral daily PRN allergies  LORazepam     Tablet 2 milliGRAM(s) Oral every 6 hours PRN agitation  nicotine  Polacrilex Gum 2 milliGRAM(s) Oral every 2 hours PRN nicotine cravings  traZODone 50 milliGRAM(s) Oral at bedtime PRN Insomnia  
MEDICATIONS  (STANDING):  divalproex DR 1000 milliGRAM(s) Oral at bedtime  risperiDONE   Tablet 2 milliGRAM(s) Oral at bedtime    MEDICATIONS  (PRN):  acetaminophen     Tablet .. 650 milliGRAM(s) Oral every 6 hours PRN Moderate Pain (4 - 6)  haloperidol     Tablet 5 milliGRAM(s) Oral every 6 hours PRN agitation  haloperidol    Injectable 5 milliGRAM(s) IntraMuscular Once PRN agitation  loratadine 10 milliGRAM(s) Oral daily PRN allergies  LORazepam     Tablet 2 milliGRAM(s) Oral every 6 hours PRN Agitation  LORazepam   Injectable 2 milliGRAM(s) IntraMuscular Once PRN Agitation  nicotine  Polacrilex Gum 2 milliGRAM(s) Oral every 2 hours PRN nicotine cravings  
MEDICATIONS  (STANDING):  divalproex  milliGRAM(s) Oral at bedtime  risperiDONE   Tablet 2 milliGRAM(s) Oral at bedtime    MEDICATIONS  (PRN):  acetaminophen     Tablet .. 650 milliGRAM(s) Oral every 6 hours PRN Moderate Pain (4 - 6)  haloperidol     Tablet 5 milliGRAM(s) Oral every 6 hours PRN agitation  haloperidol    Injectable 5 milliGRAM(s) IntraMuscular Once PRN agitation  LORazepam     Tablet 2 milliGRAM(s) Oral every 6 hours PRN Agitation  LORazepam   Injectable 2 milliGRAM(s) IntraMuscular Once PRN Agitation  nicotine  Polacrilex Gum 2 milliGRAM(s) Oral every 2 hours PRN nicotine cravings  
MEDICATIONS  (STANDING):  divalproex DR 1000 milliGRAM(s) Oral at bedtime    MEDICATIONS  (PRN):  acetaminophen     Tablet .. 650 milliGRAM(s) Oral every 6 hours PRN Moderate Pain (4 - 6)  diphenhydrAMINE 50 milliGRAM(s) Oral every 6 hours PRN agitation  haloperidol     Tablet 5 milliGRAM(s) Oral every 6 hours PRN agitation  haloperidol    Injectable 5 milliGRAM(s) IntraMuscular Once PRN agitation  loratadine 10 milliGRAM(s) Oral daily PRN allergies  LORazepam     Tablet 2 milliGRAM(s) Oral every 6 hours PRN agitation  nicotine  Polacrilex Gum 2 milliGRAM(s) Oral every 2 hours PRN nicotine cravings  traZODone 50 milliGRAM(s) Oral at bedtime PRN Insomnia

## 2023-05-17 NOTE — BH INPATIENT PSYCHIATRY PROGRESS NOTE - NSICDXBHPRIMARYDX_PSY_ALL_CORE
Psychosis   F29  

## 2023-05-17 NOTE — BH SCALES AND SCREENS - NSBPRSCONCDISORG_PSY_ALL_CORE
2 = Very Mild - e.g., somewhat vague, but of doubtful clinical significance
4 = Moderate - e.g., occasional irrelevant statements, infrequent use of neologisms, or moderate loosening of associations
3 = Mild - e.g., frequently vague, but the interview is able to progress smoothly

## 2023-05-17 NOTE — BH INPATIENT PSYCHIATRY PROGRESS NOTE - NSTXMEDICPROGRES_PSY_ALL_CORE
Improving
No Change
Improving
No Change
Improving
No Change
No Change
Improving

## 2023-05-17 NOTE — BH SCALES AND SCREENS - NSBPRSUNUTHOCON_PSY_ALL_CORE
2 = Very Mild – delusion(s) suspected or likely

## 2023-05-17 NOTE — BH SCALES AND SCREENS - NSBPRSGRANDIOS_PSY_ALL_CORE
2 = Very Mild – e.g., is more confident than most people, but of only possible clinical significance

## 2023-05-17 NOTE — BH INPATIENT PSYCHIATRY PROGRESS NOTE - NSTXPSYCHODATETRGT_PSY_ALL_CORE
03-May-2023
10-May-2023
03-May-2023
17-May-2023
03-May-2023
03-May-2023
17-May-2023
10-May-2023
17-May-2023
10-May-2023
03-May-2023
10-May-2023
10-May-2023
17-May-2023
03-May-2023
24-May-2023
03-May-2023
17-May-2023

## 2023-05-17 NOTE — BH INPATIENT PSYCHIATRY PROGRESS NOTE - NSBHATTESTTYPEVISIT_PSY_A_CORE
Attending Only
Attending with Resident/Fellow/Student
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
## 2023-05-17 NOTE — BH INPATIENT PSYCHIATRY PROGRESS NOTE - NSTXPSYCHOPROGRES_PSY_ALL_CORE
No Change
Improving
No Change
Improving
No Change
Improving
No Change
Improving
Improving

## 2023-05-17 NOTE — BH INPATIENT PSYCHIATRY PROGRESS NOTE - MSE UNSTRUCTURED FT
On exam today the patient is cooperative  Speech is clear.  Thought process: Linear  Thought content: Discharge focused, no delusional content elicited  Perception: Denies auditory hallucinations or other perceptual disturbances  Mood: Describes as "Good".   Affect: constricted, stable  Patient denies passive/active suicidal ideation, intention and plan.    Patient denies  aggressive/homicidal ideation, intent or plan.   Patient is alert and oriented .   Fund of knowledge is fair. Memory is improving.  Insight and judgment are improving.   Impulse control is intact at this time.

## 2023-05-17 NOTE — BH INPATIENT PSYCHIATRY PROGRESS NOTE - NSTXPSYCHOGOAL_PSY_ALL_CORE
Will identify 2 coping skills that assist with focus on reality
Will identify 1 trigger/stressor that exacerbates hallucinations
Will identify 2 coping skills that assist with focus on reality
Other...
Will identify 1 trigger/stressor that exacerbates hallucinations
Will identify 1 trigger/stressor that exacerbates hallucinations
Will identify 2 coping skills that assist with focus on reality
Will identify 2 coping skills that assist with focus on reality
Other...
Other...
Will identify 2 coping skills that assist with focus on reality
Will identify 1 trigger/stressor that exacerbates hallucinations
Will identify 2 coping skills that assist with focus on reality
Will identify 2 coping skills that assist with focus on reality

## 2023-05-17 NOTE — BH INPATIENT PSYCHIATRY PROGRESS NOTE - NSTXPROBVIOLNT_PSY_ALL_CORE
VIOLENT/AGGRESSIVE BEHAVIOR

## 2023-05-17 NOTE — BH INPATIENT PSYCHIATRY PROGRESS NOTE - NSBHFUPINTERVALHXFT_PSY_A_CORE
Patient seen for follow up of psychosis and discharge day management   Chart reviewed and case discussed with interdisciplinary team.   Patient calmer and more cooperative and stable for discharge today

## 2023-05-17 NOTE — BH INPATIENT PSYCHIATRY PROGRESS NOTE - NSTXDCOPNODATETRGT_PSY_ALL_CORE
09-May-2023
02-May-2023
02-May-2023
16-May-2023
09-May-2023
02-May-2023
09-May-2023
16-May-2023
09-May-2023
09-May-2023
19-May-2023
16-May-2023
02-May-2023
02-May-2023
16-May-2023
19-May-2023
02-May-2023
09-May-2023

## 2023-05-17 NOTE — BH INPATIENT PSYCHIATRY PROGRESS NOTE - NSTXVIOLNTPROGRES_PSY_ALL_CORE
Improving
No Change
Improving
No Change
Improving
No Change
No Change
Improving
No Change
Improving

## 2023-05-17 NOTE — BH INPATIENT PSYCHIATRY PROGRESS NOTE - NSICDXBHTERTIARYDX_PSY_ALL_CORE
R/O Schizophrenia   F20.9  R/O Schizoaffective disorder, bipolar type   F25.0  R/O Psychoactive substance-induced psychosis   F19.953  
R/O Schizophrenia   F20.9  R/O Schizoaffective disorder, bipolar type   F25.0  R/O Psychoactive substance-induced psychosis   F19.956  
R/O Schizophrenia   F20.9  R/O Schizoaffective disorder, bipolar type   F25.0  R/O Psychoactive substance-induced psychosis   F19.958  
R/O Schizophrenia   F20.9  R/O Schizoaffective disorder, bipolar type   F25.0  R/O Psychoactive substance-induced psychosis   F19.957  
R/O Schizophrenia   F20.9  R/O Schizoaffective disorder, bipolar type   F25.0  R/O Psychoactive substance-induced psychosis   F19.954  
R/O Schizophrenia   F20.9  R/O Schizoaffective disorder, bipolar type   F25.0  R/O Psychoactive substance-induced psychosis   F19.952  
R/O Schizophrenia   F20.9  R/O Schizoaffective disorder, bipolar type   F25.0  R/O Psychoactive substance-induced psychosis   F19.954  
R/O Schizophrenia   F20.9  R/O Schizoaffective disorder, bipolar type   F25.0  R/O Psychoactive substance-induced psychosis   F19.957  
R/O Schizophrenia   F20.9  R/O Schizoaffective disorder, bipolar type   F25.0  R/O Psychoactive substance-induced psychosis   F19.958  
R/O Schizophrenia   F20.9  R/O Schizoaffective disorder, bipolar type   F25.0  R/O Psychoactive substance-induced psychosis   F19.951  
R/O Schizophrenia   F20.9  R/O Schizoaffective disorder, bipolar type   F25.0  R/O Psychoactive substance-induced psychosis   F19.957  
R/O Schizophrenia   F20.9  R/O Schizoaffective disorder, bipolar type   F25.0  R/O Psychoactive substance-induced psychosis   F19.952  
R/O Schizophrenia   F20.9  R/O Schizoaffective disorder, bipolar type   F25.0  R/O Psychoactive substance-induced psychosis   F19.957  
R/O Schizophrenia   F20.9  R/O Schizoaffective disorder, bipolar type   F25.0  R/O Psychoactive substance-induced psychosis   F19.954  
R/O Schizophrenia   F20.9  R/O Schizoaffective disorder, bipolar type   F25.0  R/O Psychoactive substance-induced psychosis   F19.958  
R/O Schizophrenia   F20.9  R/O Schizoaffective disorder, bipolar type   F25.0  R/O Psychoactive substance-induced psychosis   F19.95  
R/O Schizophrenia   F20.9  R/O Schizoaffective disorder, bipolar type   F25.0  R/O Psychoactive substance-induced psychosis   F19.957  
R/O Schizophrenia   F20.9  R/O Schizoaffective disorder, bipolar type   F25.0  R/O Psychoactive substance-induced psychosis   F19.958
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
## 2023-05-17 NOTE — BH INPATIENT PSYCHIATRY PROGRESS NOTE - NSTXMEDICINTERMD_PSY_ALL_CORE
GREEN

## 2023-05-17 NOTE — BH INPATIENT PSYCHIATRY PROGRESS NOTE - NSTXVIOLNTGOAL_PSY_ALL_CORE
Other...
Other...
Will be able to express understanding of at least one trigger to their aggressive behavior
Other...
Will be able to express understanding of at least one trigger to their aggressive behavior
Other...
Other...
Will be able to express understanding of at least one trigger to their aggressive behavior
Other...
Other...
Will be able to walk away from an interpersonal conflict and use a self-soothing skill
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
## 2023-05-17 NOTE — BH INPATIENT PSYCHIATRY PROGRESS NOTE - NSTXDCOPNOPROGRES_PSY_ALL_CORE
No Change
Improving
No Change
Improving
No Change

## 2023-05-17 NOTE — BH INPATIENT PSYCHIATRY PROGRESS NOTE - NSTXMEDICDATEEST_PSY_ALL_CORE
24-Apr-2023
25-Apr-2023
24-Apr-2023
25-Apr-2023
25-Apr-2023
24-Apr-2023
25-Apr-2023
24-Apr-2023
17-May-2023
24-Apr-2023
25-Apr-2023

## 2023-06-19 NOTE — SOCIAL WORK POST DISCHARGE FOLLOW UP NOTE - NSBHSWFOLLOWUP_PSY_ALL_CORE_FT
SW was notified that pt did not show up for his bridge appointment in [facility].  SW was unable to reach pt.  Pt was not considered to be a danger to himself or other by the treatment team at the time of discharge. Case closed.

## 2024-03-05 NOTE — BH PATIENT PROFILE - NSDYSPHAGSECTTWO_PSY_ALL_CORE
Sedation Plan    ASA 3     Mallampati class: III.    Risks, benefits, and alternatives discussed with patient.       N/A

## 2024-06-08 ENCOUNTER — EMERGENCY (EMERGENCY)
Facility: HOSPITAL | Age: 39
LOS: 1 days | Discharge: HOME | End: 2024-06-08
Attending: EMERGENCY MEDICINE | Admitting: EMERGENCY MEDICINE
Payer: SELF-PAY

## 2024-06-08 VITALS
HEIGHT: 71 IN | SYSTOLIC BLOOD PRESSURE: 116 MMHG | OXYGEN SATURATION: 100 % | HEART RATE: 71 BPM | DIASTOLIC BLOOD PRESSURE: 68 MMHG | TEMPERATURE: 98 F | WEIGHT: 147.05 LBS | RESPIRATION RATE: 16 BRPM

## 2024-06-08 PROCEDURE — 73030 X-RAY EXAM OF SHOULDER: CPT | Mod: 26,RT

## 2024-06-08 PROCEDURE — 72125 CT NECK SPINE W/O DYE: CPT | Mod: MC

## 2024-06-08 PROCEDURE — 71250 CT THORAX DX C-: CPT | Mod: MC

## 2024-06-08 PROCEDURE — 99284 EMERGENCY DEPT VISIT MOD MDM: CPT | Mod: 25

## 2024-06-08 PROCEDURE — 73130 X-RAY EXAM OF HAND: CPT

## 2024-06-08 PROCEDURE — 72125 CT NECK SPINE W/O DYE: CPT | Mod: 26,MC

## 2024-06-08 PROCEDURE — 70450 CT HEAD/BRAIN W/O DYE: CPT | Mod: 26,MC

## 2024-06-08 PROCEDURE — 70450 CT HEAD/BRAIN W/O DYE: CPT | Mod: MC

## 2024-06-08 PROCEDURE — 71250 CT THORAX DX C-: CPT | Mod: 26,MC

## 2024-06-08 PROCEDURE — 73130 X-RAY EXAM OF HAND: CPT | Mod: 26,LT

## 2024-06-08 PROCEDURE — 73030 X-RAY EXAM OF SHOULDER: CPT

## 2024-06-08 PROCEDURE — 99053 MED SERV 10PM-8AM 24 HR FAC: CPT

## 2024-06-08 PROCEDURE — 99285 EMERGENCY DEPT VISIT HI MDM: CPT

## 2024-06-08 RX ORDER — ACETAMINOPHEN 500 MG
975 TABLET ORAL ONCE
Refills: 0 | Status: COMPLETED | OUTPATIENT
Start: 2024-06-08 | End: 2024-06-08

## 2024-06-08 RX ADMIN — Medication 975 MILLIGRAM(S): at 04:11
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
## 2024-06-08 NOTE — ED ADULT NURSE NOTE - GENITOURINARY ASSESSMENT
PORTABLE AP CHEST:

 

Date:  04/11/18 

 

HISTORY:  

Stroke alert. Patient has right-sided weakness and jerking which started several hours ago. 

 

COMPARISON:  

None available. 

 

FINDINGS:

The cardiac silhouette and pulmonary vasculature are within normal limits. There is mild elevation of
 the right hemidiaphragm. Lungs are clear. Osseous structures are intact. 

 

IMPRESSION: 

No acute cardiopulmonary process. 

 

 

POS: [facility] - - -
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
## 2024-06-08 NOTE — ED ADULT NURSE REASSESSMENT NOTE - NS ED NURSE REASSESS COMMENT FT1
Pain reassessment completed. Pt states relief post ibuprofen admin. No new orders at this time. All safety maintained

## 2024-06-08 NOTE — ED PROVIDER NOTE - MUSCULOSKELETAL MINIMAL EXAM
Tenderness to the right shoulder, range of motion restricted secondary to pain.  Normal capillary refill normal sensorimotor exam distally.  Nonspecific left hand tenderness, no swelling or ecchymosis or deformity.

## 2024-06-08 NOTE — ED PROVIDER NOTE - NSFOLLOWUPINSTRUCTIONS_ED_ALL_ED_FT
Rib Contusion    WHAT YOU NEED TO KNOW:    What is a rib contusion? A rib contusion is a bruise that appears on the skin over your rib cage after an injury. A bruise happens when small blood vessels tear but skin does not. Blood leaks into nearby tissue, such as soft tissue or muscle.    What increases my risk for a rib contusion?    A disorder that makes you bleed more easily    Kidney or liver disease, or an infection    Medicines such as blood thinners or certain over-the-counter medicines and herbal medicines    Weakened skin and muscles from older age or nutrition problems  What are the signs and symptoms of a rib contusion?    An area that may be black, blue, red, or darker than the skin around it    Pain that increases when you touch the bruise, breathe, cough, or laugh    Swelling, tenderness, or a lump at the site of the bruise or near it    Muscle spasms  How is a rib contusion diagnosed? Your healthcare provider may ask about any injuries, infections, or bleeding problems you had in the past. Your provider will check the skin over the injured area and touch it to see where it hurts. You may also need any of the following:    Blood tests may be used to check for blood disorders or to see how long it takes for your blood to clot.    Ultrasound pictures may show how deep the bruise is and if any of your organs, such as your liver, are injured.    MRI pictures may show if a hematoma (pooling of blood) has started to form. You may be given contrast liquid to help the pictures show up better. Tell the healthcare provider if you have ever had an allergic reaction to contrast liquid. Do not enter the MRI room with anything metal. The MRI machine uses a powerful magnet. Metal can cause serious injury from the magnet. Tell the healthcare provider if you have any metal in or on your body.  How is a contusion treated? The contusion may heal without any treatment. The bruise may become lighter or change to green or yellow as it heals. You may need any of the following if the contusion is severe or does not heal easily:    Medicine may be needed to treat or prevent pain or swelling.    Aspiration is a procedure to drain pooled blood in your muscle. This prevents increased pressure in the muscle.    Surgery may be done to repair a tear in the muscle or relieve pressure in the muscle caused by swelling.  What can I do manage a rib contusion?    Rest as directed. Do not play sports or have physical activity until your healthcare provider says it is okay.    Apply ice to decrease swelling and pain. Use an ice pack, or put crushed ice in a plastic bag. Cover the bag with a towel before you place it on your bruise. Apply ice for 15 to 20 minutes every hour, or as directed.    Do deep breathing exercises as directed to prevent pneumonia:  Take 10 deep breaths every hour, even when you wake up during the night. Brace your ribs with your hands or a pillow while you take deep breaths or cough. This will help decrease your pain.    You may need to use an incentive spirometer to help you take deeper breaths. Put the plastic piece into your mouth and take a deep breath. Hold your breath as long as you can. Then let out your breath. Do this 10 times in a row every hour while you are awake.  How to use and Incentive Spirometer    Do not drink alcohol as directed. Alcohol may slow healing.    Do not stretch your upper body right after your injury. Ask your healthcare provider when and how you may safely stretch after your injury. Gentle stretches can help increase your flexibility.    Do not massage the area or put heating pads on the bruise right after your injury. Heat and massage may slow healing. Your healthcare provider may tell you to apply heat after several days. At that time, heat will start to help the injury heal.  How can I prevent a rib contusion?    Stretch and warm up before you play sports or exercise.    Wear protective gear when you play sports, such as padding.    If you begin a new physical activity, start slowly to give your body a chance to adjust.  When should I seek immediate care?    You have increased chest pain.    You have shortness of breath.    You start to cough up blood.    Your pain does not improve with pain medicine.  When should I call my doctor?    You find a new lump in the injured area.    Your symptoms do not improve with treatment.    You have questions or concerns about your condition or care.  CARE AGREEMENT:    You have the right to help plan your care. Learn about your health condition and how it may be treated. Discuss treatment options with your healthcare providers to decide what care you want to receive. You always have the right to refuse treatment.

## 2024-06-08 NOTE — ED ADULT TRIAGE NOTE - PRO INTERPRETER NEED 2
"Chief Complaint   Patient presents with    Hearing Problem     Pt here with mom for longstanding conductive hearing loss.       Temp 98.6  F (37  C) (Temporal)   Ht 3' 10.69\" (118.6 cm)   Wt 50 lb 7.8 oz (22.9 kg)   BMI 16.28 kg/m      Rhianna Davidson    " English
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
## 2024-06-08 NOTE — ED ADULT NURSE NOTE - NSFALLUNIVINTERV_ED_ALL_ED
Bed/Stretcher in lowest position, wheels locked, appropriate side rails in place/Call bell, personal items and telephone in reach/Instruct patient to call for assistance before getting out of bed/chair/stretcher/Non-slip footwear applied when patient is off stretcher/[place] to call system/Physically safe environment - no spills, clutter or unnecessary equipment/Purposeful proactive rounding/Room/bathroom lighting operational, light cord in reach

## 2024-06-08 NOTE — ED PROVIDER NOTE - CARDIAC, MLM
The patient is scheduled for a follow-up visit on 2/1/23 and would like lab orders placed prior to his appointment.   
Normal rate, regular rhythm.  Heart sounds S1, S2.  No murmurs, rubs or gallops.

## 2024-06-08 NOTE — ED PROVIDER NOTE - PATIENT PORTAL LINK FT
You can access the FollowMyHealth Patient Portal offered by [facility] by registering at the following website: [email or web address]. By joining [employer]’s FollowMyHealth portal, you will also be able to view your health information using other applications (apps) compatible with our system.
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
## 2024-06-08 NOTE — ED PROVIDER NOTE - CARE PROVIDER_API CALL
Guillermo Mobley  Orthopaedic Surgery  [address] [place] [id number]  Phone: [phone]  Fax: [phone]  Follow Up Time: Routine
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
## 2024-07-13 NOTE — ED ADULT NURSE REASSESSMENT NOTE - EENT ASSESSMENT, MLM
- - -
Initial EKG revealed NSR@65BPM with slight ST changes in Lead III. Repeat EKG NSR@72BPM without acute ST/T wave changes

## 2024-10-30 NOTE — ED ADULT NURSE REASSESSMENT NOTE - REASSESS COMMUNICATION
Fall from a height of 10 feet. He does not recall events before and after fall.  Possibly d/t new medication.  Denies intentionally jumping out of window or any thoughts of self harm.   Fall precautions.    ED physician notified

## 2024-12-17 NOTE — BH INPATIENT PSYCHIATRY ASSESSMENT NOTE - NSBHATTESTBILLING_PSY_A_CORE
Physical Therapy Visit    Visit Type: Daily Treatment Note  Visit: 3  Referring Provider: Anna Gómez PA-C  Medical Diagnosis (from order): M54.2 - Neck pain on left side     SUBJECTIVE                                                                                                               Patient states that she hasn't had as many flare ups this past week.      OBJECTIVE                                                                                                                               Treatment     Therapeutic Exercise  Shoulder external rotation, yellow Tband 2 x 10  Side lying thoracic rotation x20 bilateral   Supine diaphragmatic breathing x5 minutes  Prone chin tuck with shoulder flexion, x10 unilateral, x10 bilateral   Wall slides 2 x 10    Not today:  Supine chin tuck x10 with 5\" hold - discontinued due to pain  Rows with green tube, x10  Nustep level 1 x10 minutes  Patient education on chronic pain and pain neuroscience  Seated chin tuck with cervical rotation, x10 bilateral   Sitbacks  Upper extremity - cervical dissociation retraining      Manual Therapy   Prone thoracic gapping mobilization grade I/II  Prone CT junction gapping grade II/III  STM over CT junction    Not today:  Soft tissue mobilization over Sternocleidomastoid muscle, scaleni, and cervical paraspinals  Cervical traction grade I    Skilled input: verbal instruction/cues, tactile instruction/cues and demonstration    Writer verbally educated and received verbal consent for hand placement, positioning of patient, and techniques to be performed today from patient for clothing adjustments for techniques, hand placement and palpation for techniques and therapist position for techniques as described above and how they are pertinent to the patient's plan of care.  Home Exercise Program  Access Code: [email or web address]  URL: [email or web address]  Date: 12/17/2024  Prepared by: Marlo Charles    Exercises  -  Supine Diaphragmatic Breathing  - 1 x daily - 7 x weekly - 3 sets - 10 reps  - Standing shoulder flexion wall slides  - 1 x daily - 7 x weekly - 2 sets - 10 reps  - Chin Tuck  - 1 x daily - 7 x weekly - 2 sets - 10 reps - 5 hold  - Seated Cervical Retraction and Rotation  - 1 x daily - 7 x weekly - 2 sets - 10 reps  - Shoulder External Rotation and Scapular Retraction with Resistance  - 1 x daily - 7 x weekly - 2 sets - 10 reps      ASSESSMENT                                                                                                            Patient stiff throughout thoracic spine, mobility improved following thoracic rotations and mobilizations. Will progress patient as tolerated in upcoming sessions to reduce deficits and restore function.    Education:   - Results of above outlined education: Demonstrates understanding and Verbalizes understanding    PLAN                                                                                                                           Suggestions for next session as indicated: Progress per plan of care       Therapy procedure time and total treatment time can be found documented on the Time Entry flowsheet     99222-Initial OBS or IP - moderate complexity OR 55-74 mins
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
## 2025-04-20 ENCOUNTER — INPATIENT (INPATIENT)
Facility: HOSPITAL | Age: 40
LOS: 4 days | Discharge: HOME | End: 2025-04-25
Attending: PSYCHIATRY & NEUROLOGY | Admitting: PSYCHIATRY & NEUROLOGY
Payer: MEDICAID

## 2025-04-20 VITALS
RESPIRATION RATE: 16 BRPM | DIASTOLIC BLOOD PRESSURE: 98 MMHG | WEIGHT: 160.06 LBS | HEART RATE: 91 BPM | TEMPERATURE: 98 F | SYSTOLIC BLOOD PRESSURE: 148 MMHG | OXYGEN SATURATION: 100 %

## 2025-04-20 DIAGNOSIS — F25.0 SCHIZOAFFECTIVE DISORDER, BIPOLAR TYPE: ICD-10-CM

## 2025-04-20 LAB
A1C WITH ESTIMATED AVERAGE GLUCOSE RESULT: 5.2 % — SIGNIFICANT CHANGE UP (ref 4–5.6)
ADD ON TEST-SPECIMEN IN LAB: SIGNIFICANT CHANGE UP
ALBUMIN SERPL ELPH-MCNC: 4.5 G/DL — SIGNIFICANT CHANGE UP (ref 3.3–5)
ALP SERPL-CCNC: 41 U/L — SIGNIFICANT CHANGE UP (ref 40–120)
ALT FLD-CCNC: 24 U/L — SIGNIFICANT CHANGE UP (ref 4–41)
ANION GAP SERPL CALC-SCNC: 14 MMOL/L — SIGNIFICANT CHANGE UP (ref 7–14)
APAP SERPL-MCNC: <10 UG/ML — LOW (ref 15–25)
AST SERPL-CCNC: 52 U/L — HIGH (ref 4–40)
BASOPHILS # BLD AUTO: 0.03 K/UL — SIGNIFICANT CHANGE UP (ref 0–0.2)
BASOPHILS NFR BLD AUTO: 0.4 % — SIGNIFICANT CHANGE UP (ref 0–2)
BILIRUB SERPL-MCNC: 1.3 MG/DL — HIGH (ref 0.2–1.2)
BUN SERPL-MCNC: 20 MG/DL — SIGNIFICANT CHANGE UP (ref 7–23)
CALCIUM SERPL-MCNC: 9.1 MG/DL — SIGNIFICANT CHANGE UP (ref 8.4–10.5)
CHLORIDE SERPL-SCNC: 103 MMOL/L — SIGNIFICANT CHANGE UP (ref 98–107)
CHOLEST SERPL-MCNC: 147 MG/DL — SIGNIFICANT CHANGE UP
CO2 SERPL-SCNC: 22 MMOL/L — SIGNIFICANT CHANGE UP (ref 22–31)
CREAT SERPL-MCNC: 1.08 MG/DL — SIGNIFICANT CHANGE UP (ref 0.5–1.3)
EGFR: 90 ML/MIN/1.73M2 — SIGNIFICANT CHANGE UP
EGFR: 90 ML/MIN/1.73M2 — SIGNIFICANT CHANGE UP
EOSINOPHIL # BLD AUTO: 0.01 K/UL — SIGNIFICANT CHANGE UP (ref 0–0.5)
EOSINOPHIL NFR BLD AUTO: 0.1 % — SIGNIFICANT CHANGE UP (ref 0–6)
ESTIMATED AVERAGE GLUCOSE: 103 — SIGNIFICANT CHANGE UP
ETHANOL SERPL-MCNC: <10 MG/DL — SIGNIFICANT CHANGE UP
GLUCOSE SERPL-MCNC: 83 MG/DL — SIGNIFICANT CHANGE UP (ref 70–99)
HCT VFR BLD CALC: 39.4 % — SIGNIFICANT CHANGE UP (ref 39–50)
HDLC SERPL-MCNC: 95 MG/DL — SIGNIFICANT CHANGE UP
HGB BLD-MCNC: 13.1 G/DL — SIGNIFICANT CHANGE UP (ref 13–17)
IANC: 5.96 K/UL — SIGNIFICANT CHANGE UP (ref 1.8–7.4)
IMM GRANULOCYTES NFR BLD AUTO: 0.2 % — SIGNIFICANT CHANGE UP (ref 0–0.9)
LDLC SERPL-MCNC: 43 MG/DL — SIGNIFICANT CHANGE UP
LIPID PNL WITH DIRECT LDL SERPL: 43 MG/DL — SIGNIFICANT CHANGE UP
LYMPHOCYTES # BLD AUTO: 1.49 K/UL — SIGNIFICANT CHANGE UP (ref 1–3.3)
LYMPHOCYTES # BLD AUTO: 18.3 % — SIGNIFICANT CHANGE UP (ref 13–44)
MCHC RBC-ENTMCNC: 32.2 PG — SIGNIFICANT CHANGE UP (ref 27–34)
MCHC RBC-ENTMCNC: 33.2 G/DL — SIGNIFICANT CHANGE UP (ref 32–36)
MCV RBC AUTO: 96.8 FL — SIGNIFICANT CHANGE UP (ref 80–100)
MONOCYTES # BLD AUTO: 0.61 K/UL — SIGNIFICANT CHANGE UP (ref 0–0.9)
MONOCYTES NFR BLD AUTO: 7.5 % — SIGNIFICANT CHANGE UP (ref 2–14)
NEUTROPHILS # BLD AUTO: 5.96 K/UL — SIGNIFICANT CHANGE UP (ref 1.8–7.4)
NEUTROPHILS NFR BLD AUTO: 73.5 % — SIGNIFICANT CHANGE UP (ref 43–77)
NONHDLC SERPL-MCNC: 52 MG/DL — SIGNIFICANT CHANGE UP
NRBC # BLD AUTO: 0 K/UL — SIGNIFICANT CHANGE UP (ref 0–0)
NRBC # FLD: 0 K/UL — SIGNIFICANT CHANGE UP (ref 0–0)
NRBC BLD AUTO-RTO: 0 /100 WBCS — SIGNIFICANT CHANGE UP (ref 0–0)
PLATELET # BLD AUTO: 293 K/UL — SIGNIFICANT CHANGE UP (ref 150–400)
POTASSIUM SERPL-MCNC: 4.9 MMOL/L — SIGNIFICANT CHANGE UP (ref 3.5–5.3)
POTASSIUM SERPL-SCNC: 4.9 MMOL/L — SIGNIFICANT CHANGE UP (ref 3.5–5.3)
PROT SERPL-MCNC: 7.4 G/DL — SIGNIFICANT CHANGE UP (ref 6–8.3)
RBC # BLD: 4.07 M/UL — LOW (ref 4.2–5.8)
RBC # FLD: 12.6 % — SIGNIFICANT CHANGE UP (ref 10.3–14.5)
SALICYLATES SERPL-MCNC: <0.3 MG/DL — LOW (ref 15–30)
SARS-COV-2 RNA SPEC QL NAA+PROBE: SIGNIFICANT CHANGE UP
SODIUM SERPL-SCNC: 139 MMOL/L — SIGNIFICANT CHANGE UP (ref 135–145)
TOXICOLOGY SCREEN, DRUGS OF ABUSE, SERUM RESULT: SIGNIFICANT CHANGE UP
TRIGL SERPL-MCNC: 37 MG/DL — SIGNIFICANT CHANGE UP
TSH SERPL-MCNC: 0.67 UIU/ML — SIGNIFICANT CHANGE UP (ref 0.27–4.2)
WBC # BLD: 8.12 K/UL — SIGNIFICANT CHANGE UP (ref 3.8–10.5)
WBC # FLD AUTO: 8.12 K/UL — SIGNIFICANT CHANGE UP (ref 3.8–10.5)

## 2025-04-20 PROCEDURE — 99285 EMERGENCY DEPT VISIT HI MDM: CPT

## 2025-04-20 RX ORDER — LORAZEPAM 4 MG/ML
2 VIAL (ML) INJECTION ONCE
Refills: 0 | Status: DISCONTINUED | OUTPATIENT
Start: 2025-04-20 | End: 2025-04-20

## 2025-04-20 RX ORDER — OLANZAPINE 10 MG/1
5 TABLET ORAL ONCE
Refills: 0 | Status: DISCONTINUED | OUTPATIENT
Start: 2025-04-20 | End: 2025-04-21

## 2025-04-20 RX ORDER — OLANZAPINE 10 MG/1
5 TABLET ORAL ONCE
Refills: 0 | Status: COMPLETED | OUTPATIENT
Start: 2025-04-20 | End: 2025-04-20

## 2025-04-20 RX ORDER — OLANZAPINE 10 MG/1
5 TABLET ORAL DAILY
Refills: 0 | Status: DISCONTINUED | OUTPATIENT
Start: 2025-04-21 | End: 2025-04-21

## 2025-04-20 RX ORDER — HALOPERIDOL 10 MG/1
5 TABLET ORAL ONCE
Refills: 0 | Status: DISCONTINUED | OUTPATIENT
Start: 2025-04-20 | End: 2025-04-20

## 2025-04-20 RX ADMIN — OLANZAPINE 5 MILLIGRAM(S): 10 TABLET ORAL at 18:00

## 2025-04-20 NOTE — ED BEHAVIORAL HEALTH ASSESSMENT NOTE - HPI (INCLUDE ILLNESS QUALITY, SEVERITY, DURATION, TIMING, CONTEXT, MODIFYING FACTORS, ASSOCIATED SIGNS AND SYMPTOMS)
Pt is a 41 yo M, no dependents, unstably housed, currently unemployed, past psych hx of schizoaffective d/o, bipolar, multiple hospitalizations last July 2024 at [facility], no known SA, denies medical issues, no known legal, questionable hx of aggression, hx of cannabis/etoh use no hx of withdrawals, presenting to [facility] ED BIB EMS for increasingly bizarre behavior over past month.    Per patient, "there was a misunderstanding and I was trying to go to bed but my girlfriend wouldn't let me because they want to redecorate the basement.' pt says he has an appointment 'to get my passport on april 28th' pt says he is 'going somewhere nice but is unable to provide further details 2/2 psychotic presentation saying "I want to get a jeep or a truck...yeah I could look good in a jeep." pt reports mood is 'great', reports sleeping 8-9 hrs /night, denies anxiety or panic. denies avh/ior. denies si/hi/vi  See  social work note for further collateral. Mom advocating for admission to [facility], says pt is unable to care for himself, not showering, not sleeping, walking around neighborhood until early morning/late evening. Mom concerned about patient missing his citizenship appt on 4/28.

## 2025-04-20 NOTE — ED ADULT NURSE NOTE - OBJECTIVE STATEMENT
A&Ox4. Past medical history bipolar, schizoaffective, depression and SA . Presents to the ED with 3 days with increased gilma per family .  Pt noted to be hyperverbal in triage. Denies SI/HI. Denies CP, SOB, or N/V/D. Respirations even and unlabored. Safety precautions in place.

## 2025-04-20 NOTE — ED PROVIDER NOTE - CCCP TRG CHIEF CMPLNT
-Creatinine 1.8, repeat pending    5/3/2024  Continue monitoring    5/4/2024  Cr improving    5/5/2024  CR IS 1.6 RONALD DD LOW DOSE DIURETICS    5/6/24  -Creatinine 1.7, monitor    5/7/24  -Creatinine bumped to 2.3, repeat BMP scheduled for 3 PM   insomnia

## 2025-04-20 NOTE — ED BEHAVIORAL HEALTH ASSESSMENT NOTE - OTHER PAST PSYCHIATRIC HISTORY (INCLUDE DETAILS REGARDING ONSET, COURSE OF ILLNESS, INPATIENT/OUTPATIENT TREATMENT)
per chart pt has bipolar disorder, schizoaffective, substance use disorder  pt most recently received treatment for psychosis during most recent hospitalization in july 2024. no outpatient psychiatric care since.

## 2025-04-20 NOTE — ED ADULT NURSE REASSESSMENT NOTE - NS ED NURSE REASSESS COMMENT FT1
Pt sleeping in BH room, easily arousable, calm and cooperative.  ambulatory. no acute distress noted, Respirations even and unlabored.  plan of care ongoing

## 2025-04-20 NOTE — BH PATIENT PROFILE - FALL HARM RISK - UNIVERSAL INTERVENTIONS
Instructions: This plan will send the code FBSE to the PM system.  DO NOT or CHANGE the price. Price (Do Not Change): 0.00 Detail Level: Generalized Bed in lowest position, wheels locked, appropriate side rails in place/Call bell, personal items and telephone in reach/Instruct patient to call for assistance before getting out of bed or chair/Non-slip footwear when patient is out of bed/[place] to call system/Physically safe environment - no spills, clutter or unnecessary equipment/Purposeful Proactive Rounding/Room/bathroom lighting operational, light cord in reach

## 2025-04-20 NOTE — ED PROVIDER NOTE - NSICDXPASTMEDICALHX_GEN_ALL_CORE_FT
PAST MEDICAL HISTORY:  Bipolar disorder     Major depressive disorder     Schizoaffective disorder     Substance abuse alcohol cannabis

## 2025-04-20 NOTE — ED BEHAVIORAL HEALTH ASSESSMENT NOTE - RISK ASSESSMENT
pt is elevated acute risk for self injury, risk factors include mood sxs, psychotic sxs, noncompliance, multiple hospitalizations. protective factors include supportive family, no prior SA, future oriented

## 2025-04-20 NOTE — ED ADULT TRIAGE NOTE - CHIEF COMPLAINT QUOTE
pt complains of insomnia x 3 days with increased gilma per family .  pt noted to be hyperverbal in triage. pt denies  S/I, H/I, hallucination, changes in vision, chest pain, headache, nausea, dizziness, vomiting,  SOB, fever, and  chills. pt past medical hx of schizoaffective, bipolar, and BPD  non compliant with meds.

## 2025-04-20 NOTE — BH PATIENT PROFILE - STATED REASON FOR ADMISSION
If you are a smoker, it is important for your health to stop smoking. Please be aware that second hand smoke is also harmful. "I don't know"

## 2025-04-20 NOTE — ED BEHAVIORAL HEALTH ASSESSMENT NOTE - SUMMARY
Pt is a 41 yo M, no dependents, unstably housed, currently unemployed, past psych hx of schizoaffective d/o, bipolar, multiple hospitalizations last July 2024 at [facility], no known SA, denies medical issues, no known legal, questionable hx of aggression, hx of cannabis/etoh use no hx of withdrawals, presenting to [facility] ED BIB EMS for increasingly bizarre behavior over past month.    On exam, pt is disorganized, with flight of ideas, hyperverbal, endorsing euphoric mood. Loose associations though process. Per mom, this is patients baseline when he psychiatrically decompensates. Pt has not been sleeping, not showering or alternately showering 3-4x/day. Mom concerned that patient is unable to care for himself. Given hx and presentation, pt is likely experiencing a relapse of his SZA and requires inpatient hospitalization for safety, stabilization and medication optimization.   per mom, pt has 4/28 appt for citizenship, would like inpatient team to contact her about when to reschedule appt    plan  - admit to [facility] 2N  - no CO needed at this time, under good behavioral control  - start zyprexa 5mg, depakote 1000mg  - zyprexa 5mg IM PRN for severe agitation
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
## 2025-04-20 NOTE — ED BEHAVIORAL HEALTH ASSESSMENT NOTE - DESCRIPTION
see collateral T(C): 36.8 (04-20-25 @ 15:48), Max: 36.8 (04-20-25 @ 15:48)  HR: 91 (04-20-25 @ 15:48) (91 - 91)  BP: 148/98 (04-20-25 @ 15:48) (148/98 - 148/98)  RR: 16 (04-20-25 @ 15:48) (16 - 16)  SpO2: 100% (04-20-25 @ 15:48) (100% - 100%) none

## 2025-04-20 NOTE — ED PROVIDER NOTE - CLINICAL SUMMARY MEDICAL DECISION MAKING FREE TEXT BOX
41 yo M PMH Bipolar, BPD, Schizoaffective Disorder, Substance Abuse p/w increased concern for gilma. Patient presents hyperverbal, pressured speech, disorganized thought process. Patient admits noncompliance with medications and Q6 months injections. Patient requesting evaluation to he can be stabilized for his immigration papers on 04/28. Presents internally preoccupied. Denies fever, headache, dizziness, SI/HI/AH/VH/violence, chills, chest pain, shortness of breath, abdominal pain, sick contact or recent travel. Denies alcohol use or other drugs. Patient is not flagged for complex care needs. Follow up with [facility] pending s/p discharge.   Labs, EKG, COVID  SW collateral  Psych consult  Dispo as per consult

## 2025-04-20 NOTE — BH PATIENT PROFILE - HOME MEDICATIONS
divalproex sodium 500 mg oral tablet, extended release , 2 tab(s) orally once a day (at bedtime) MDD: 1000mg  fluPHENAZine 10 mg oral tablet , 1 tab(s) orally once a day (at bedtime) MDD: 10mg

## 2025-04-21 DIAGNOSIS — F19.90 OTHER PSYCHOACTIVE SUBSTANCE USE, UNSPECIFIED, UNCOMPLICATED: ICD-10-CM

## 2025-04-21 DIAGNOSIS — R45.1 RESTLESSNESS AND AGITATION: ICD-10-CM

## 2025-04-21 DIAGNOSIS — F29 UNSPECIFIED PSYCHOSIS NOT DUE TO A SUBSTANCE OR KNOWN PHYSIOLOGICAL CONDITION: ICD-10-CM

## 2025-04-21 PROBLEM — F25.9 SCHIZOAFFECTIVE DISORDER, UNSPECIFIED: Chronic | Status: ACTIVE | Noted: 2024-06-27

## 2025-04-21 PROBLEM — F19.10 OTHER PSYCHOACTIVE SUBSTANCE ABUSE, UNCOMPLICATED: Chronic | Status: ACTIVE | Noted: 2024-06-27

## 2025-04-21 PROBLEM — F31.9 BIPOLAR DISORDER, UNSPECIFIED: Chronic | Status: ACTIVE | Noted: 2024-06-27

## 2025-04-21 PROBLEM — F32.9 MAJOR DEPRESSIVE DISORDER, SINGLE EPISODE, UNSPECIFIED: Chronic | Status: ACTIVE | Noted: 2024-06-27

## 2025-04-21 PROCEDURE — 99223 1ST HOSP IP/OBS HIGH 75: CPT

## 2025-04-21 RX ORDER — MAGNESIUM, ALUMINUM HYDROXIDE 200-200 MG
30 TABLET,CHEWABLE ORAL EVERY 6 HOURS
Refills: 0 | Status: DISCONTINUED | OUTPATIENT
Start: 2025-04-21 | End: 2025-04-25

## 2025-04-21 RX ORDER — MELATONIN 5 MG
5 TABLET ORAL ONCE
Refills: 0 | Status: COMPLETED | OUTPATIENT
Start: 2025-04-21 | End: 2025-04-22

## 2025-04-21 RX ORDER — IBUPROFEN 200 MG
400 TABLET ORAL ONCE
Refills: 0 | Status: COMPLETED | OUTPATIENT
Start: 2025-04-21 | End: 2025-04-22

## 2025-04-21 RX ORDER — OLANZAPINE 10 MG/1
5 TABLET ORAL
Refills: 0 | Status: DISCONTINUED | OUTPATIENT
Start: 2025-04-21 | End: 2025-04-21

## 2025-04-21 RX ORDER — LORAZEPAM 4 MG/ML
2 VIAL (ML) INJECTION ONCE
Refills: 0 | Status: DISCONTINUED | OUTPATIENT
Start: 2025-04-21 | End: 2025-04-25

## 2025-04-21 RX ORDER — HALOPERIDOL 10 MG/1
5 TABLET ORAL EVERY 6 HOURS
Refills: 0 | Status: DISCONTINUED | OUTPATIENT
Start: 2025-04-21 | End: 2025-04-25

## 2025-04-21 RX ORDER — OLANZAPINE 10 MG/1
5 TABLET ORAL ONCE
Refills: 0 | Status: DISCONTINUED | OUTPATIENT
Start: 2025-04-21 | End: 2025-04-21

## 2025-04-21 RX ORDER — FLUPHENAZINE HCL 10 MG
5 TABLET ORAL
Refills: 0 | Status: DISCONTINUED | OUTPATIENT
Start: 2025-04-21 | End: 2025-04-22

## 2025-04-21 RX ORDER — HALOPERIDOL 10 MG/1
5 TABLET ORAL ONCE
Refills: 0 | Status: DISCONTINUED | OUTPATIENT
Start: 2025-04-21 | End: 2025-04-25

## 2025-04-21 RX ORDER — ACETAMINOPHEN 500 MG/5ML
650 LIQUID (ML) ORAL ONCE
Refills: 0 | Status: COMPLETED | OUTPATIENT
Start: 2025-04-21 | End: 2025-04-21

## 2025-04-21 RX ORDER — LORAZEPAM 4 MG/ML
2 VIAL (ML) INJECTION EVERY 6 HOURS
Refills: 0 | Status: DISCONTINUED | OUTPATIENT
Start: 2025-04-21 | End: 2025-04-25

## 2025-04-21 RX ADMIN — Medication 1000 MILLIGRAM(S): at 08:00

## 2025-04-21 RX ADMIN — Medication 30 MILLILITER(S): at 20:49

## 2025-04-21 RX ADMIN — Medication 30 MILLILITER(S): at 12:18

## 2025-04-21 NOTE — BH INPATIENT PSYCHIATRY ASSESSMENT NOTE - NSBHASSESSSUMMFT_PSY_ALL_CORE
39 yo male with schizoaffective disorder, unstably housed, hx of cannabis use, prior hospitalizations, hx of aggression, no known hx of self-harm, admitted due to psychotic and manic decompensation. 9.39 legal status    Patient on arrival, agitated, disorganized, poor boundaries, psych emergency was called and pt ultimately accepted PO PRNs. Patient with hx of substance use though he denies and utox sample wasn't provided by patient. Presentation consistent with manic and psychotic decompensation likely triggered by poor compliance to treatment + substance use. Hospitalization indicated at this time. hospitalization will aid with assurance of safety, stabilization of symptoms, optimization of medications. no CO indicated at this time. Given patient previously on Prolixin, will order that and VPA.     1. Safety: standard obs     2. Psychiatric:  -  Prolixin 5mg BID   - VPA 1000mg daily   -PRNs: Haldol 5mg + Lorazepam 2mg PO or IM for non-redirectable agitation   -Individual, group, milieu therapy  -Psychoeducation provided to patient regarding indication for medications, alternatives, side effects, adherence.    3. Medical:  -Patient has been screened and medically cleared for psychiatric admission. will coordinate with hospitalist as needed   -PRN: Tylenol 650mg q6-8hr for moderate pain  -Routine labs    4. Disposition: Pending clinical course; coordinate with team social work    5. Legal status: 9.39

## 2025-04-21 NOTE — BH INPATIENT PSYCHIATRY ASSESSMENT NOTE - NSBHCHARTREVIEWVS_PSY_A_CORE FT
Vital Signs Last 24 Hrs  T(C): 36.7 (04-21-25 @ 08:19), Max: 36.8 (04-20-25 @ 15:48)  T(F): 98 (04-21-25 @ 08:19), Max: 98.2 (04-20-25 @ 15:48)  HR: 82 (04-20-25 @ 19:50) (82 - 91)  BP: 118/65 (04-20-25 @ 19:50) (118/65 - 148/98)  BP(mean): --  RR: 18 (04-20-25 @ 20:45) (16 - 18)  SpO2: 100% (04-20-25 @ 20:45) (97% - 100%)    Orthostatic VS  04-21-25 @ 08:19  Lying BP: --/-- HR: --  Sitting BP: 106/63 HR: 84  Standing BP: 111/64 HR: 91  Site: --  Mode: --  Orthostatic VS  04-20-25 @ 20:45  Lying BP: --/-- HR: --  Sitting BP: 118/62 HR: 111  Standing BP: 116/67 HR: 114  Site: --  Mode: --

## 2025-04-21 NOTE — BH INPATIENT PSYCHIATRY ASSESSMENT NOTE - NSBHMETABOLIC_PSY_ALL_CORE_FT
BMI: BMI (kg/m2): 17.7 (04-20-25 @ 20:45)  HbA1c: A1C with Estimated Average Glucose Result: 5.2 % (04-20-25 @ 17:30)    Glucose:   BP: 118/65 (04-20-25 @ 19:50) (118/65 - 148/98)Vital Signs Last 24 Hrs  T(C): 36.7 (04-21-25 @ 08:19), Max: 36.8 (04-20-25 @ 15:48)  T(F): 98 (04-21-25 @ 08:19), Max: 98.2 (04-20-25 @ 15:48)  HR: 82 (04-20-25 @ 19:50) (82 - 91)  BP: 118/65 (04-20-25 @ 19:50) (118/65 - 148/98)  BP(mean): --  RR: 18 (04-20-25 @ 20:45) (16 - 18)  SpO2: 100% (04-20-25 @ 20:45) (97% - 100%)    Orthostatic VS  04-21-25 @ 08:19  Lying BP: --/-- HR: --  Sitting BP: 106/63 HR: 84  Standing BP: 111/64 HR: 91  Site: --  Mode: --  Orthostatic VS  04-20-25 @ 20:45  Lying BP: --/-- HR: --  Sitting BP: 118/62 HR: 111  Standing BP: 116/67 HR: 114  Site: --  Mode: --    Lipid Panel: Date/Time: 04-20-25 @ 17:30  Cholesterol, Serum: 147  LDL Cholesterol Calculated: 43  HDL Cholesterol, Serum: 95  Total Cholesterol/HDL Ration Measurement: --  Triglycerides, Serum: 37

## 2025-04-21 NOTE — BH INPATIENT PSYCHIATRY ASSESSMENT NOTE - MSE UNSTRUCTURED FT
Appearance: Dressed appropriately.  Attitude / Behavior / relatedness: tense   Eye contact: appropriate   Motor: activated  Speech: overproductive   Mood: "fine"  Affect: elevated; dysphoric   Thought Process: circular, illogical, loose associations   Thought Content: devoid of SI and HI   Perceptual: denies avh  Insight: poor   Judgment: impaired  Impulse control:  poor   Cognition: grossly intact  Gait: Intact.

## 2025-04-21 NOTE — PSYCHIATRIC REHAB INITIAL EVALUATION - NSBHPRRECOMMEND_PSY_ALL_CORE
Writer met with patient in order to orient patient to unit, provide patient with a unit schedule, and introduce patient to psychiatric rehabilitation staff and department functions. Patient presented disorganized and elevated upon approach. Patient was unable to meaningfully engage in initial meeting and was irritable towards writer. Patient will be oriented to unit 2N as well as the role/function of  and inpatient psychiatric rehabilitation programming at a later time. Writer and patient were unable to collaborate on a psychiatric rehabilitation goal, therefore one will be chosen for him. Psych rehab staff will continue to engage patient daily in order to build therapeutic rapport.

## 2025-04-21 NOTE — BH INPATIENT PSYCHIATRY ASSESSMENT NOTE - CURRENT MEDICATION
MEDICATIONS  (STANDING):  divalproex ER 1000 milliGRAM(s) Oral daily  OLANZapine 5 milliGRAM(s) Oral two times a day    MEDICATIONS  (PRN):  aluminum hydroxide/magnesium hydroxide/simethicone Suspension 30 milliLiter(s) Oral every 6 hours PRN Dyspepsia  haloperidol     Tablet 5 milliGRAM(s) Oral every 6 hours PRN psychotic agitation  haloperidol    Injectable 5 milliGRAM(s) IntraMuscular once PRN agitation  LORazepam     Tablet 2 milliGRAM(s) Oral every 6 hours PRN agitation  LORazepam   Injectable 2 milliGRAM(s) IntraMuscular once PRN agitation

## 2025-04-21 NOTE — PSYCHIATRIC REHAB INITIAL EVALUATION - PATIENT'S GENDER IDENTITY
Chief complaint:   Chief Complaint   Patient presents with   • Loop Recorder     Medtronic        Vitals:  Visit Vitals  /70   Pulse 72   Ht 5' 2\" (1.575 m)   Wt 105.6 kg   LMP  (LMP Unknown)   BMI 42.58 kg/m²       HISTORY OF PRESENT ILLNESS     Pt presents to the clinic to discuss Atrial Flutter and Atrial Fibrillation management.  At her previous visit on 1/17/2018, it was noted that she had recurrence of Atrial Flutter with RVR despite starting Cardizem for rate control.  ECG was suggestive of an atypical AFL and given the fact that she was reporting an increase in shortness of breath ( more so than her baseline dyspnea due to COPD ), it was decided to proceed with repeat Electrophysiology Study and attempted catheter ablation of AFL.  She was already anticoagulated with Warfarin at her previous visit.     EKG today NSR. Patient is frustrated with ongoing SOB. She denies lightheadedness, dizziness. She denies syncope. She is tired all the time. She states she does not sleep well at night and has little energy.         Other significant problems:  Patient Active Problem List    Diagnosis Date Noted   • Anxiety 07/19/2017     Priority: Low   • Supraventricular tachycardia (CMS/HCC) 07/11/2017     Priority: Low   • Medtronic implanted Loop recorder 04/27/2017     Priority: Low     HAS REMOTE FOLLOW UP - AF Monitor group     • CAD (coronary artery disease) 01/24/2017     Priority: Low   • Aortic stenosis 09/14/2016     Priority: Low   • Morbid obesity due to excess calories (CMS/HCC) 08/31/2016     Priority: Low   • HTN (hypertension) 08/31/2016     Priority: Low   • Hypertensive heart disease without heart failure 08/31/2016     Priority: Low   • Acute diffuse otitis externa of left ear 06/16/2016     Priority: Low   • Cellulitis of left external ear 06/16/2016     Priority: Low   • Exertional shortness of breath 05/10/2016     Priority: Low   • Laryngitis 04/10/2014     Priority: Low   • COPD, moderate  (CMS/[facility]) 03/27/2014     Priority: Low   • Cough 02/27/2014     Priority: Low   • Medication reaction 10/21/2013     Priority: Low   • Nicotine abuse 10/21/2013     Priority: Low   • Vitamin B12 deficiency 08/13/2013     Priority: Low   • Long term (current) use of anticoagulants 06/05/2012     Priority: Low   • Dyslipidemia 05/01/2012     Priority: Low   • Atrial Fibrillation / Atrial Flutter 04/03/2012     Priority: Low     Echo date: 8/3/16  LVEF: 55 %  LA size: 69 mL  IVS: 1.2 cm          LVPW: 1.1 cm  RVSP: 29.8 mmHg  Coronary status: PTCa/ stent 2016     CHADS-VASc score: 3   Anticoagulation therapy: Warfarin     Antiarrhythmic medications: None  Procedures:  AF/AFL RFA 2/28/17 - Successful pulmonary veins isolation using radiofrequency ablation for atrial fibrillation (WACA).    Additional focal ablation in the left atrium including isolation of posterior wall, isolation of left atrial appendage, roof line, mitral isthmus line and successful ablation of right-sided atrial flutter.         • Back pain 04/02/2012     Priority: Low   • Sciatica 04/02/2012     Priority: Low       PAST MEDICAL, FAMILY AND SOCIAL HISTORY     Medications:  Current Outpatient Prescriptions   Medication   • ferrous sulfate 325 (65 FE) MG tablet   • aspirin 81 MG tablet   • sodium chloride (OCEAN) 0.65 % nasal spray   • warfarin (COUMADIN) 2 MG tablet   • dilTIAZem (CARDIZEM CD) 120 MG 24 hr capsule   • ezetimibe (ZETIA) 10 MG tablet   • allopurinol (ZYLOPRIM) 100 MG tablet   • ALPRAZolam (XANAX) 0.25 MG tablet   • bumetanide (BUMEX) 1 MG tablet   • pantoprazole (PROTONIX) 40 MG tablet   • vitamin B-12 (CYANOCOBALAMIN) 1000 MCG tablet   • cholecalciferol (VITAMIN D3) 1000 UNITS tablet     No current facility-administered medications for this visit.        Allergies:  ALLERGIES:   Allergen Reactions   • Crestor [Rosuvastatin Calcium] HEADACHES and SHORTNESS OF BREATH     Headache, SOB   • Lipitor [Atorvastatin Calcium] DIARRHEA and  Male SHORTNESS OF BREATH       Past Medical  History/Surgeries:  Past Medical History:   Diagnosis Date   • Anemia    • Aortic stenosis     mild    • Atrial fibrillation (CMS/HCC) 07/2017    Tikosyn started   • COPD (chronic obstructive pulmonary disease) (CMS/[facility])    • Coronary artery disease    • Gastroesophageal reflux disease    • High cholesterol    • HTN (hypertension)    • Obesity    • Wears glasses     for distance / driving       Past Surgical History:   Procedure Laterality Date   • Cardiac catherization  11/21/2016    mild to moderate CAD, stent to diagonal   • Ep study/atrial flutter ablation - cv  02/28/2017    Afib / flutter ablation   • Hysterectomy  7/14/2010   • Loop recorder implant  04/26/2017   • Removal gallbladder     • Removal of tonsils,14+ y/o  age 16   • Tubal ligation     • Vaginal delivery  x2       Family History:  Family History   Problem Relation Age of Onset   • Asthma Mother    • Heart disease Mother    • Stroke Father        Social History:  Social History   Substance Use Topics   • Smoking status: Former Smoker     Packs/day: 0.10     Years: 35.00     Types: Cigarettes     Quit date: 7/14/2016   • Smokeless tobacco: Never Used      Comment: quit 7/14/2016   • Alcohol use No       REVIEW OF SYSTEMS     Review of Systems   Constitutional: Positive for activity change and fatigue.   Respiratory: Positive for shortness of breath.    Cardiovascular: Positive for leg swelling. Negative for chest pain and palpitations.   Neurological: Negative for dizziness, syncope and light-headedness.   All other systems reviewed and are negative.      PHYSICAL EXAM     Physical Exam   Constitutional: She is oriented to person, place, and time. She appears well-developed and well-nourished. No distress.   HENT:   Head: Normocephalic and atraumatic.   Eyes: Pupils are equal, round, and reactive to light.   Neck: Normal range of motion.   Cardiovascular: Normal rate, regular rhythm, normal heart sounds and  intact distal pulses.  Exam reveals no gallop and no friction rub.    No murmur heard.  Pulmonary/Chest: Effort normal and breath sounds normal. No respiratory distress. She has no wheezes. She has no rales. She exhibits no tenderness.   Abdominal: Soft.   Musculoskeletal: Normal range of motion. She exhibits no edema or deformity.   Neurological: She is alert and oriented to person, place, and time. No cranial nerve deficit.   Skin: Skin is warm and dry. She is not diaphoretic.   Psychiatric: She has a normal mood and affect. Her behavior is normal. Judgment and thought content normal.       ASSESSMENT/PLAN     Atrial Fibrillation / Atrial Flutter  Echo date: 8/3/16  LVEF: 55 %  LA size: 69 mL  IVS: 1.2 cm          LVPW: 1.1 cm  RVSP: 29.8 mmHg  Coronary status: PTCa/ stent 2016     CHADS-VASc score: 3   Anticoagulation therapy: Warfarin     Antiarrhythmic medications: None  Procedures:  AF/AFL RFA 2/28/17 - Successful pulmonary veins isolation using radiofrequency ablation for atrial fibrillation (WACA).    Additional focal ablation in the left atrium including isolation of posterior wall, isolation of left atrial appendage, roof line, mitral isthmus line and successful ablation of right-sided atrial flutter.    Patient in sinus rhythm today. She still has SOB which do not appears to be due to flutter only.  We will continue to monitor. If she has recurrence again then will consider ablation.    Long term (current) use of anticoagulants  Warfarin and aspirin. Tolerating well. Stop aspirin   Continue on with warfarin    Medtronic implanted Loop recorder  Incision line approximated, no s/s of infection  Device check WNL  SV-SR  Afib burden 6.6%    COPD, moderate (CMS/HCC)   c/o ongoing SOB, fatigue, not sleeping well at night     Plan:  1. Sleep study with pulmonologist as previously discussed  2. Increase bumex to 2mg daily  3. Follow up in three months     On 3/2/2018, Rivka HERNANDEZ RN scribed the services  personally performed by Neris Mane MD     I, Dr. Mane, was present with Rivka Barrett RN, who acted as a scribe for me during the entire visit. The documentation reflects the services I personally performed and the plan was formulated by myself.

## 2025-04-21 NOTE — BH INPATIENT PSYCHIATRY ASSESSMENT NOTE - HPI (INCLUDE ILLNESS QUALITY, SEVERITY, DURATION, TIMING, CONTEXT, MODIFYING FACTORS, ASSOCIATED SIGNS AND SYMPTOMS)
Per ED assessment:   “Pt is a 41 yo M, no dependents, unstably housed, currently unemployed, past psych hx of schizoaffective d/o, bipolar, multiple hospitalizations last July 2024 at [facility], no known SA, denies medical issues, no known legal, questionable hx of aggression, hx of cannabis/etoh use no hx of withdrawals, presenting to [facility] ED BIB EMS for increasingly bizarre behavior over past month.  Per patient, "there was a misunderstanding and I was trying to go to bed but my girlfriend wouldn't let me because they want to redecorate the basement.' pt says he has an appointment 'to get my passport on april 28th' pt says he is 'going somewhere nice but is unable to provide further details 2/2 psychotic presentation saying "I want to get a jeep or a truck...yeah I could look good in a jeep." pt reports mood is 'great', reports sleeping 8-9 hrs /night, denies anxiety or panic. denies avh/ior. denies si/hi/vi  See  social work note for further collateral. Mom advocating for admission to [facility], says pt is unable to care for himself, not showering, not sleeping, walking around neighborhood until early morning/late evening. Mom concerned about patient missing his citizenship appt on 4/28”    Upon arrival to  / initial interview:   Patient this AM was irritable, demanding, agitated, not redirectable and psych emergency was called and he was given PRN with modest effects. Patient was seen a few hours after his episode of agitation. Of note, patient with flight of ideas, with loose associations and irrelevant responses. His responses are also contradictory. He states he is in the hospital because his sister and mother were fighting and EMS was called and EMS asked if he wanted to come into he hospital to get a glass of water. He denies nicotine and substance use despite chart records indicating hx of substance use (utox not collected). Patient denies avh, and his responses are sarcastic and animated related to the inquiry. Denies feeling depressed. Denies si and hi. denies hx of violence and asks “what kind of people does that?” he denies currently being on medications. He denies any active medical problems. Denies allergies. Per records, patient was on Risperdal and with complaints related to gynecomastia.

## 2025-04-21 NOTE — PSYCHIATRIC REHAB INITIAL EVALUATION - NSBHALCSUBTREAT_PSY_ALL_CORE
Per chart, patient has previous hospitalizations and outpatient treatment but unclear where patient went for treatment.

## 2025-04-21 NOTE — BH SOCIAL WORK INITIAL PSYCHOSOCIAL EVALUATION - OTHER PAST PSYCHIATRIC HISTORY (INCLUDE DETAILS REGARDING ONSET, COURSE OF ILLNESS, INPATIENT/OUTPATIENT TREATMENT)
According to ED Behavioral Health Assessment, "Pt is a 39 yo M, no dependents, unstably housed, currently unemployed, past psych hx of schizoaffective d/o, bipolar, multiple hospitalizations last July 2024 at [facility], no known SA, denies medical issues, no known legal, questionable hx of aggression, hx of cannabis/etoh use no hx of withdrawals, presenting to [facility] ED BIB EMS for increasingly bizarre behavior over past month."

## 2025-04-21 NOTE — BH CHART NOTE - NSEVENTNOTEFT_PSY_ALL_CORE
Jens Pardo: QI paged at 350a for pt agitation. Per nursing report, has been elevated this morning, intrusive, and making racial comments towards staff. Refusing PO PRNs. Zyprexa 5 mg IM STAT activated for severe agitation 2/2 psychosis.  Jens Pardo: QI paged at 697b for pt agitation. Per nursing report, has been elevated this morning, intrusive, and making racial comments towards staff. Refusing PO PRNs. Zyprexa 5 mg IM STAT activated for severe agitation 2/2 psychosis. Psych emergency subsequently called at 0813 for ongoing agitation. Zydis 5 mg PO x1 ordered as alternative PRN to be given with his morning medications. On unit, pt with agitation which is partially related to size of depakote pill. For primary team: Zyprexa 5 mg IM STAT remains active at end of shift change, cancel if not using. Consider switching to depakene syrup if continuing with this treatment.

## 2025-04-22 PROCEDURE — 99232 SBSQ HOSP IP/OBS MODERATE 35: CPT

## 2025-04-22 RX ORDER — FLUPHENAZINE HCL 10 MG
10 TABLET ORAL DAILY
Refills: 0 | Status: DISCONTINUED | OUTPATIENT
Start: 2025-04-23 | End: 2025-04-25

## 2025-04-22 RX ORDER — FLUPHENAZINE HCL 10 MG
25 TABLET ORAL ONCE
Refills: 0 | Status: COMPLETED | OUTPATIENT
Start: 2025-04-22 | End: 2025-04-22

## 2025-04-22 RX ORDER — MELATONIN 5 MG
5 TABLET ORAL AT BEDTIME
Refills: 0 | Status: DISCONTINUED | OUTPATIENT
Start: 2025-04-22 | End: 2025-04-25

## 2025-04-22 RX ADMIN — Medication 400 MILLIGRAM(S): at 03:40

## 2025-04-22 RX ADMIN — Medication 400 MILLIGRAM(S): at 02:35

## 2025-04-22 RX ADMIN — Medication 5 MILLIGRAM(S): at 02:35

## 2025-04-22 RX ADMIN — Medication 1000 MILLIGRAM(S): at 09:06

## 2025-04-22 RX ADMIN — Medication 5 MILLIGRAM(S): at 22:40

## 2025-04-22 RX ADMIN — Medication 5 MILLIGRAM(S): at 09:06

## 2025-04-22 RX ADMIN — Medication 25 MILLIGRAM(S): at 16:15

## 2025-04-22 NOTE — BH CHART NOTE - NSEVENTNOTEFT_PSY_ALL_CORE
Rafat Stewart: QI paged at [phone]. Nursing reports sudden weakness while sitting in wheelchair. Per nursing report, slumped in chair and wasn’t responding to   [phone] with nondescript seizure activity. Placed in his hospital bed afterwards. On arrival to the unit, pt non-responsive to questioning. Able to open his eyes on sternal rub. Noted to have 5 second of non-rhythmic shaking of limbs during interview. No treatment administered at that time. Nursing staff monitored patient in the subsequent hour. Patient noted to convulse only when nursing staff entered the room. Eventually agreeable to intake of applesauce with his HS medications.     VSS.   PE:   Resp: CTA b/l. No wheezes, ronchi, or crackles.  CV: Radial pulses 2+ b/l, RRR, no murmurs.  Ext: ROM intact, no clubbing, edema, or cyanosis.   Neuro: PERLLA. Full neuro exam not performed. No rigidity in all extremities.     A/P:   Pscychogenic seizures. No further treatment indicated.

## 2025-04-22 NOTE — BH CHART NOTE - NSEVENTNOTEFT_PSY_ALL_CORE
Pt seen at request of primary team for purposes of PC.  Chart reviewed.  Pt is a 40 year old man, with pphx of schizoaffective disorder, admitted with manic psychosis.  Pt on interview is preoccupied with a peer's stye, asks writer if it is due to a blocked duct, asks what should be done about it.  Pt states his moods are ok, when asked if hearing voices, states he hears writer's voice just fine through mask, and he denies any thoughts of harming self or others.  He states that his treatment team is working on an injection for him.  Pt then returns to discussing stye with pt and nearby staff.  Pt on interview is psychomotor activated, appears elated in affect, is tangential at times with loosening of associations.  Pt remains symptomatic.  As per team, pt has been ambivalent about treatment, refused Depakote first day, took it second day but refused fluphenazine.  Primary team is seeking court order for treatment over objection, converting to 9.27 for this purpose.  Pt remains acute risk of harm due to inability to safely care for self, requires further admission for safety and stabilization.  PC completed and in chart.

## 2025-04-23 PROCEDURE — 99232 SBSQ HOSP IP/OBS MODERATE 35: CPT

## 2025-04-23 RX ADMIN — Medication 5 MILLIGRAM(S): at 20:30

## 2025-04-23 RX ADMIN — Medication 10 MILLIGRAM(S): at 08:55

## 2025-04-23 RX ADMIN — Medication 1000 MILLIGRAM(S): at 09:56

## 2025-04-23 NOTE — BH DISCHARGE NOTE NURSING/SOCIAL WORK/PSYCH REHAB - DISCHARGE INSTRUCTIONS AFTERCARE APPOINTMENTS
In order to check the location, date, or time of your aftercare appointment, please refer to your Discharge Instructions Document given to you upon leaving the hospital.  If you have lost the instructions please call [phone]

## 2025-04-23 NOTE — BH PSYCHOLOGY - GROUP THERAPY NOTE - NSPSYCHOLGRPCOGPT_PSY_A_CORE FT
Patient attended Cognitive Behavioral Therapy Group incorporating ACT-based concepts. The group began with a brief check-in asking patients to share something they are looking forward to. Group then in a word search for mindfulness practice and were encouraged to practice staying focused on one task at a time, as well as notice any emotions, thoughts, or judgments that arose. Processed mindfulness experience afterwards. Group was then introduced to the “bad news radio” metaphor within the ACT “open up” domain and engaged in discussion about thought suppression vs acceptance of unwanted experiences. Group facilitators explained concepts, reinforced participation, and engaged patients in the discussion.  Patient attended Cognitive Behavioral Therapy Group incorporating ACT-based concepts. The group began with a brief check-in asking patients to share something they are looking forward to. Group then engaged in a word search for mindfulness practice and were encouraged to practice staying focused on one task at a time, as well as notice any emotions, thoughts, or judgments that arose. Processed mindfulness experience afterwards. Group was then introduced to the “bad news radio” metaphor within the ACT “open up” domain and engaged in discussion about thought suppression vs acceptance of unwanted experiences. Group facilitator explained concepts, reinforced participation, and engaged patients in the discussion.

## 2025-04-23 NOTE — BH DISCHARGE NOTE NURSING/SOCIAL WORK/PSYCH REHAB - NSDCADDINFO1FT_PSY_ALL_CORE
This is your first appointment at [facility]. It is a mental health and substance use program. Please arrive early, and bring your insurance and ID card for registration.

## 2025-04-23 NOTE — BH PSYCHOLOGY - GROUP THERAPY NOTE - NSBHPSYCHOLRESPCOMMENT_PSY_A_CORE FT
The patient appeared adequately groomed and casually dressed. Pt appeared engaged in group as evidenced by his willingness to verbally communicate during the discussion. Pt was hyperverbal but redirectable. He sometimes became briefly agitated. He presented as tired, and when asked if he wanted to go lay down, pt responded that the medications make him tired. He noted feeling that he had to stay in group, because when he is back at work he won’t be able to go lay down because he does not “just get checks like other people here.” He also mentioned wanting to transfer units due to being followed. Pt left group early. Pt was oriented X3.

## 2025-04-23 NOTE — BH DISCHARGE NOTE NURSING/SOCIAL WORK/PSYCH REHAB - FINANCIAL ASSISTANCE
[facility] provides services at a reduced cost to those who are determined to be eligible through [facility]’s financial assistance program. Information regarding [facility]’s financial assistance program can be found by going to [email or web address] or by calling [phone].

## 2025-04-23 NOTE — BH DISCHARGE NOTE NURSING/SOCIAL WORK/PSYCH REHAB - NSCDUDCCRISIS_PSY_A_CORE
.  [facility]  [phone] [place] [id number] [phone]   Hours:  Monday through Friday from 9 AM to 3 PM/[facility]

## 2025-04-23 NOTE — BH DISCHARGE NOTE NURSING/SOCIAL WORK/PSYCH REHAB - PATIENT PORTAL LINK FT
You can access the FollowMyHealth Patient Portal offered by [facility] by registering at the following website: [email or web address]. By joining [facility]’s FollowMyHealth portal, you will also be able to view your health information using other applications (apps) compatible with our system.

## 2025-04-23 NOTE — BH DISCHARGE NOTE NURSING/SOCIAL WORK/PSYCH REHAB - NSDCPRGOAL_PSY_ALL_CORE
Over the course of the current hospitalization, Psychiatric Rehabilitation Staff and patient discussed level of functioning, addressed concerns surrounding the hospitalization, discharge, engaged in skill development and safety planning. Patient completed the goal of exhibiting a substantial reduction in elated/angry acting out, and pressured speech that prevents mutual conversation. Patient showed this by identifying ways to cope, "Breathing techniques, going for a walk/exercising and ignoring people that try to get a reaction out of me." During Psychiatric Hospitalization patient was found mostly in dinning room or by the phones, appropriately dressed/groomed and appeared calm. The patient attended most of the Psychiatric groups. Patient was energetic and interactive with others in psychiatric rehabilitation groups. Patient completed a safety plan upon discharge. Patient was given a copy of safety plan prior to discharge.

## 2025-04-24 PROCEDURE — 99232 SBSQ HOSP IP/OBS MODERATE 35: CPT

## 2025-04-24 RX ORDER — MELATONIN 5 MG
1 TABLET ORAL
Qty: 14 | Refills: 0
Start: 2025-04-24 | End: 2025-05-07

## 2025-04-24 RX ORDER — FLUPHENAZINE HCL 10 MG
1 TABLET ORAL
Qty: 12 | Refills: 0
Start: 2025-04-24 | End: 2025-05-05

## 2025-04-24 RX ORDER — FLUPHENAZINE HCL 10 MG
25 TABLET ORAL
Qty: 1 | Refills: 0
Start: 2025-04-24 | End: 2025-04-24

## 2025-04-24 RX ADMIN — Medication 5 MILLIGRAM(S): at 20:44

## 2025-04-24 RX ADMIN — Medication 10 MILLIGRAM(S): at 10:00

## 2025-04-24 RX ADMIN — Medication 1000 MILLIGRAM(S): at 10:00

## 2025-04-24 NOTE — BH INPATIENT PSYCHIATRY PROGRESS NOTE - NSBHMETABOLIC_PSY_ALL_CORE_FT
BMI: BMI (kg/m2): 17.7 (04-20-25 @ 20:45)  HbA1c: A1C with Estimated Average Glucose Result: 5.2 % (04-20-25 @ 17:30)    Glucose:   BP: 118/65 (04-20-25 @ 19:50) (118/65 - 148/98)Vital Signs Last 24 Hrs  T(C): 36.4 (04-22-25 @ 06:58), Max: 36.4 (04-22-25 @ 06:58)  T(F): 97.6 (04-22-25 @ 06:58), Max: 97.6 (04-22-25 @ 06:58)  HR: --  BP: --  BP(mean): --  RR: --  SpO2: --    Orthostatic VS  04-22-25 @ 06:58  Lying BP: --/-- HR: --  Sitting BP: 134/84 HR: 68  Standing BP: 137/89 HR: 67  Site: upper right arm  Mode: electronic  Orthostatic VS  04-21-25 @ 21:02  Lying BP: --/-- HR: --  Sitting BP: 132/97 HR: 108  Standing BP: --/-- HR: --  Site: --  Mode: --  Orthostatic VS  04-21-25 @ 08:19  Lying BP: --/-- HR: --  Sitting BP: 106/63 HR: 84  Standing BP: 111/64 HR: 91  Site: --  Mode: --  Orthostatic VS  04-20-25 @ 20:45  Lying BP: --/-- HR: --  Sitting BP: 118/62 HR: 111  Standing BP: 116/67 HR: 114  Site: --  Mode: --    Lipid Panel: Date/Time: 04-20-25 @ 17:30  Cholesterol, Serum: 147  LDL Cholesterol Calculated: 43  HDL Cholesterol, Serum: 95  Total Cholesterol/HDL Ration Measurement: --  Triglycerides, Serum: 37  
BMI: BMI (kg/m2): 17.7 (04-20-25 @ 20:45)  HbA1c: A1C with Estimated Average Glucose Result: 5.2 % (04-20-25 @ 17:30)    Glucose:   BP: 118/65 (04-20-25 @ 19:50) (118/65 - 148/98)Vital Signs Last 24 Hrs  T(C): 36.8 (04-23-25 @ 06:17), Max: 36.8 (04-23-25 @ 06:17)  T(F): 98.2 (04-23-25 @ 06:17), Max: 98.2 (04-23-25 @ 06:17)  HR: --  BP: --  BP(mean): --  RR: --  SpO2: --    Orthostatic VS  04-23-25 @ 06:17  Lying BP: --/-- HR: --  Sitting BP: 136/88 HR: 90  Standing BP: 126/82 HR: 114  Site: --  Mode: --  Orthostatic VS  04-22-25 @ 20:42  Lying BP: --/-- HR: --  Sitting BP: 140/88 HR: 102  Standing BP: 139/87 HR: 101  Site: --  Mode: --  Orthostatic VS  04-22-25 @ 06:58  Lying BP: --/-- HR: --  Sitting BP: 134/84 HR: 68  Standing BP: 137/89 HR: 67  Site: upper right arm  Mode: electronic  Orthostatic VS  04-21-25 @ 21:02  Lying BP: --/-- HR: --  Sitting BP: 132/97 HR: 108  Standing BP: --/-- HR: --  Site: --  Mode: --    Lipid Panel: Date/Time: 04-20-25 @ 17:30  Cholesterol, Serum: 147  LDL Cholesterol Calculated: 43  HDL Cholesterol, Serum: 95  Total Cholesterol/HDL Ration Measurement: --  Triglycerides, Serum: 37  
BMI: BMI (kg/m2): 17.7 (04-20-25 @ 20:45)  HbA1c: A1C with Estimated Average Glucose Result: 5.2 % (04-20-25 @ 17:30)    Glucose:   BP: --Vital Signs Last 24 Hrs  T(C): 36.9 (04-24-25 @ 06:39), Max: 36.9 (04-24-25 @ 06:39)  T(F): 98.4 (04-24-25 @ 06:39), Max: 98.4 (04-24-25 @ 06:39)  HR: --  BP: --  BP(mean): --  RR: --  SpO2: 100% (04-23-25 @ 20:57) (100% - 100%)    Orthostatic VS  04-24-25 @ 06:39  Lying BP: --/-- HR: --  Sitting BP: 120/90 HR: 81  Standing BP: 118/81 HR: 90  Site: upper right arm  Mode: electronic  Orthostatic VS  04-23-25 @ 20:57  Lying BP: --/-- HR: --  Sitting BP: 137/81 HR: 88  Standing BP: 129/82 HR: 103  Site: --  Mode: --  Orthostatic VS  04-23-25 @ 06:17  Lying BP: --/-- HR: --  Sitting BP: 136/88 HR: 90  Standing BP: 126/82 HR: 114  Site: --  Mode: --  Orthostatic VS  04-22-25 @ 20:42  Lying BP: --/-- HR: --  Sitting BP: 140/88 HR: 102  Standing BP: 139/87 HR: 101  Site: --  Mode: --    Lipid Panel: Date/Time: 04-20-25 @ 17:30  Cholesterol, Serum: 147  LDL Cholesterol Calculated: 43  HDL Cholesterol, Serum: 95  Total Cholesterol/HDL Ration Measurement: --  Triglycerides, Serum: 37

## 2025-04-24 NOTE — BH INPATIENT PSYCHIATRY PROGRESS NOTE - NSICDXBHSECONDARYDX_PSY_ALL_CORE
Psychosis   F29  Agitation   R45.1  Substance use   F19.90  
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
## 2025-04-24 NOTE — BH INPATIENT PSYCHIATRY DISCHARGE NOTE - REASON FOR ADMISSION
Patient brought to hospital via EMS due to concerns for disorganization in setting of poor compliance to treatment + suspected substance use

## 2025-04-24 NOTE — BH INPATIENT PSYCHIATRY PROGRESS NOTE - NSBHFUPINTERVALHXFT_PSY_A_CORE
Chart reviewed including pertinent labs, imaging, ekg. case discussed with treatment team.  Over this interval: good behavioral control over this interval. attending groups and interacting with peers appropriately. he has been medication compliant over the last few days. he is sleeping better per his own account and based on sleep log. he denies avh. denies elevations in energy. denies racing thoughts. he states he looks forward to discharge so he can see his girlfriend and get back to his life. Patient and his family seems to have tenuous relationship and he can at times be heard arguing on the phone. he has retracted, consented, retracted HIPAA consents for his family. 
Patient slept poorly last night per sleep log. patient reports sleeping several hours, which he states is improvements relative to past few days. He appears elevated though relatively less impulsive compared to yesterday. He endorses having elevated energy. He reports previously being on medications and states he isn’t sure if he can afford them. We discuss his past regimen that included Prolixin and Valproate. He states he needs to go to his immigration hearing this Monday (confirmed with his mother). Patient states he will to his best to utilize this milieu and take medications and advocates for discharge prior to his immigration hearing. Patient noted to be elevated and intrusive upon arrival, less so today and more agreeable to treatment plan. He denies AVH. 
chart reviewed including pertinent labs. Case discussed with nursing staff. patient adherent to medications. he asks for liquid valproate due to pills being too large. his in good behavioral control over the last 24 hour interval. He accepted Prolixin dec yesterday, tolerating without issues. Pt denies racing thoughts, reports sleeping better last night (sleep still fragmented per sleep log) and he feels his energy is fine. He is overall calmer and more appropriately engaged today. he states he has plans to go to groups today

## 2025-04-24 NOTE — BH INPATIENT PSYCHIATRY PROGRESS NOTE - NSTXPROBDCOPNO_PSY_ALL_CORE
Dr Lal updated on arrival of cytotec induction, pt got postponed to 0400 induction from 0001 induction due to acuity, cytotec placed at 0445 with SVE of 1/50/-2 posterior, intact, FHT's Category I.  , 37 1/7wks, induced for cholestasis at 37 1/7wks.   
DISCHARGE ISSUE - NON-ADHERENT WITH OUTPATIENT SERVICES

## 2025-04-24 NOTE — BH TREATMENT PLAN - NSTXPSYCHOINTERRN_PSY_ALL_CORE
Nurse will monitor pt for changes in behavior and redirect as needed
Nurse will monitor pt for changes in behavior and redirect as needed

## 2025-04-24 NOTE — BH INPATIENT PSYCHIATRY DISCHARGE NOTE - NSDCCPCAREPLAN_GEN_ALL_CORE_FT
PRINCIPAL DISCHARGE DIAGNOSIS  Diagnosis: Severe bipolar disorder with psychotic features  Assessment and Plan of Treatment: medications + therapy

## 2025-04-24 NOTE — BH INPATIENT PSYCHIATRY DISCHARGE NOTE - NSBHMETABOLIC_PSY_ALL_CORE_FT
BMI: BMI (kg/m2): 17.7 (04-20-25 @ 20:45)  HbA1c: A1C with Estimated Average Glucose Result: 5.2 % (04-20-25 @ 17:30)    Glucose:   BP: --Vital Signs Last 24 Hrs  T(C): 36.9 (04-24-25 @ 06:39), Max: 36.9 (04-24-25 @ 06:39)  T(F): 98.4 (04-24-25 @ 06:39), Max: 98.4 (04-24-25 @ 06:39)  HR: --  BP: --  BP(mean): --  RR: --  SpO2: 100% (04-23-25 @ 20:57) (100% - 100%)    Orthostatic VS  04-24-25 @ 06:39  Lying BP: --/-- HR: --  Sitting BP: 120/90 HR: 81  Standing BP: 118/81 HR: 90  Site: upper right arm  Mode: electronic  Orthostatic VS  04-23-25 @ 20:57  Lying BP: --/-- HR: --  Sitting BP: 137/81 HR: 88  Standing BP: 129/82 HR: 103  Site: --  Mode: --  Orthostatic VS  04-23-25 @ 06:17  Lying BP: --/-- HR: --  Sitting BP: 136/88 HR: 90  Standing BP: 126/82 HR: 114  Site: --  Mode: --  Orthostatic VS  04-22-25 @ 20:42  Lying BP: --/-- HR: --  Sitting BP: 140/88 HR: 102  Standing BP: 139/87 HR: 101  Site: --  Mode: --    Lipid Panel: Date/Time: 04-20-25 @ 17:30  Cholesterol, Serum: 147  LDL Cholesterol Calculated: 43  HDL Cholesterol, Serum: 95  Total Cholesterol/HDL Ration Measurement: --  Triglycerides, Serum: 37

## 2025-04-24 NOTE — BH INPATIENT PSYCHIATRY DISCHARGE NOTE - MSE UNSTRUCTURED FT
Appearance: Dressed appropriately.  Attitude / Behavior / relatedness: cooperative. respectful   Eye contact: appropriate   Motor: No abnormal movements, no psychomotor slowing or activation.  Speech: Regular rate. not pressured  Mood: "good"  Affect: neutral. reactive  Thought Process: overall linear   Thought Content: devoid of SI and HI . + future oriented.   Perceptual: denies AVH   Insight: developing / fair   Judgment: appropriate   Impulse control:  good at this time   Cognition: grossly intact  Gait: Intact.

## 2025-04-24 NOTE — BH INPATIENT PSYCHIATRY PROGRESS NOTE - NSBHATTESTBILLING_PSY_A_CORE
27743-Wcouscvvgx OBS or IP - moderate complexity OR 35-49 mins
69255-Poottpznwe OBS or IP - moderate complexity OR 35-49 mins
55679-Hkagmafwna OBS or IP - moderate complexity OR 35-49 mins

## 2025-04-24 NOTE — BH TREATMENT PLAN - NSTXPLANTYPE_PSY_ALL_CORE
Subjective:     Patient ID: Murray Lynn Jr is a 38 y.o. male.    Chief Complaint: Nail Problem    Murray is a 38 y.o. male who presents to the clinic complaining of thick and discolored toenails on both feet. Murray is inquiring about treatment options.    Review of Systems   Constitutional: Negative for chills.   Cardiovascular:  Negative for chest pain and claudication.   Respiratory:  Negative for cough.    Skin:  Positive for color change, dry skin and nail changes.   Musculoskeletal:  Positive for joint pain.   Gastrointestinal:  Negative for nausea.   Neurological:  Positive for paresthesias. Negative for numbness.   Psychiatric/Behavioral:  The patient is not nervous/anxious.         Objective:     Physical Exam  Constitutional:       Appearance: He is well-developed.      Comments: Oriented to time, place, and person.   Cardiovascular:      Comments: DP and PT pulses are palpable bilaterally. 3 sec capillary refill time and toes and feet are warm to touch proximally .  There is  hair growth on the feet and toes b/l. There is no edema b/l. No spider veins or varicosities present b/l.     Musculoskeletal:      Comments: Equinus noted b/l ankles with < 10 deg DF noted. MMT 5/5 in DF/PF/Inv/Ev resistance with no reproduction of pain in any direction. Passive range of motion of ankle and pedal joints is painless b/l.     Feet:      Right foot:      Skin integrity: No callus or dry skin.      Left foot:      Skin integrity: No callus or dry skin.   Lymphadenopathy:      Comments: Negative lymphadenopathy bilateral popliteal fossa and tarsal tunnel.   Skin:     Comments:   Toenails 1-5 bilaterally are elongated by 2-3 mm, thickened by 2-3 mm, discolored/yellowed, dystrophic, brittle with subungual debris.       Neurological:      Mental Status: He is alert.      Comments: Light touch, proprioception, and sharp/dull sensation are all intact bilaterally. Protective threshold with the Quincy-Wienstein 
Initial
monofilament is intact bilaterally.    Psychiatric:         Behavior: Behavior is cooperative.           Assessment:      Encounter Diagnosis   Name Primary?    Onychomycosis      Plan:     Murray was seen today for nail problem.    Diagnoses and all orders for this visit:    Onychomycosis  -     Ambulatory referral/consult to Podiatry      I counseled the patient on his conditions, their implications and medical management.      Instructed patient on the importance of keeping feet dry. Patient instructed to use absorbent cotton socks and change them if they become sweaty; or wear an open-toe shoe or sandal. Wash the feet at least once a day with soap and water. Patient instructed to use lysol or over-the-counter antifungal powders or sprays to shoes daily and allow them to air dry, switching shoes from every other day would be optimal. Patient is to avoid barefoot walking in high-risk environments (public showers, gyms and locker rooms) may prevent future infections.     RTC PRN     
Ongoing
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
## 2025-04-24 NOTE — BH INPATIENT PSYCHIATRY DISCHARGE NOTE - NSBHASSESSSUMMFT_PSY_ALL_CORE
MR. Pardo is a 40 year old male with bipolar disorder, hx of cannabis use admitted due to manic exacerbation in setting of noncompliance to medication+ suspected substance use (utox not collected) . at this time, patient's symptoms are improved, he is demonstrating appropriate judgment, is adherent to treatment including receiving GEREN earlier this week. Ongoing care is safe to continue in a setting less restrictive

## 2025-04-24 NOTE — BH INPATIENT PSYCHIATRY PROGRESS NOTE - NSTXPSYCHOGOAL_PSY_ALL_CORE
State that he/she is only about 50% sure of the certainty of the delusions instead of 100% certain

## 2025-04-24 NOTE — BH INPATIENT PSYCHIATRY PROGRESS NOTE - MSE UNSTRUCTURED FT
Appearance: Dressed appropriately.  Attitude / Behavior / relatedness: more cooperative; calmer   Eye contact: appropriate   Motor: less activated  Speech: overproductive   Mood: "great"  Affect: somewhat elevated; relative improvements     Thought Process: somewhat overinclusive   Thought Content: devoid of SI and HI   Perceptual: denies avh  Insight: poor / developing   Judgment: improving   Impulse control:  improving    Cognition: grossly intact  Gait: Intact. 
Appearance: Dressed appropriately.  Attitude / Behavior / relatedness: expansive at times tense   Eye contact: appropriate   Motor: less activated  Speech: overproductive   Mood: "fine"  Affect: elevated;    Thought Process: overinclusive   Thought Content: devoid of SI and HI   Perceptual: denies avh  Insight: poor   Judgment: impaired  Impulse control:  tenuous   Cognition: grossly intact  Gait: Intact. 
Appearance: Dressed appropriately.  Attitude / Behavior / relatedness: more cooperative; calmer   Eye contact: appropriate   Motor: normal   Speech: spontaneous    Mood: "great"  Affect: slightly elevated; much more neutral.   Thought Process: somewhat overinclusive   Thought Content: devoid of SI and HI   Perceptual: denies avh  Insight: fair and developing   Judgment: improving   Impulse control: appropriate   Cognition: grossly intact  Gait: Intact.

## 2025-04-24 NOTE — BH INPATIENT PSYCHIATRY DISCHARGE NOTE - HOSPITAL COURSE
Patient admitted with concerns for manic and psychotic decompensation of his underlying bipolar disorder. He was admitted under 9.39 legal status. On initial presentation, patient was demonstrating over manic symptoms including pressured speech, elevated energy, decreased need for sleep, agitation, impulsivity. Patient was started on Prolixin and Depakote. Patient initially hesitant and apprehensive to take medications, seeming to have minimal insight into his illness. With much discussion, patient agreed to take Depakote and Prolixin (and prolixin decanoate). Early hospital course notable for agitation necessitating STAT IM medications. Ultimately, once patient became more adherent to medications, his manic symptoms appeared to be attenuating and he slept better, showed improvements in energy, was less impulsive. Of note, there seemed to be a hot / cold relationship with patient and his mother and during hospital course, patient initially provided consent for team to speak with her and then ultimately retracting it and placing her on visitation restrictions. During latter hospital course, patient was calm, cooperative, less manic, following unit rules, demonstrating good range to his affect. At present, patient’s symptoms are improved such that ongoing treatment can safely continue in a setting less restrictive.      Medications on discharge:   Prolixin Decanoate 25mg [id number], next due 5/6/25  Depakote 1000mg qhs     low acute risk for self-harm. risk factors include pt's diagnosis, substance use, hx of impulsivity, mood decompensation when medication nonadherent. Patient currently denies SI, has no hx of SA. at present, patient not endorsing self-harm, is future oriented, able to list protective factors that include his girlfriend, family, has developing insight and good reality testing, no access to firearms. low acute risk for self-harm. Low acute risk for harm to others. risk factors include male sex, hx of impulsivity, substance use hx. protective factor includes pt denies previous hx of violence towards others, not currently expressing harm to others, understands consequences of harm to others, not showing behavior suggestive of imminent or acute harm to others, no access to firearms

## 2025-04-24 NOTE — BH INPATIENT PSYCHIATRY PROGRESS NOTE - NSBHASSESSSUMMFT_PSY_ALL_CORE
41 yo male with schizoaffective disorder, unstably housed, hx of cannabis use, prior hospitalizations, hx of aggression, no known hx of self-harm, admitted due to psychotic and manic decompensation. 9.39 legal status    attenuating gilma that corresponds neatly with medication adherence, abstinence from substance. Patient with developing insight, better impulses, on GREEN and mood stabilizer. Tentative plans for discharge tomorrow. of note, patient and family have challenging dynamic and they are upset about our inability to release info when patient pulls HIPAA consent. At this time, there are no acute safety concerns. plan as below     1. Safety: standard obs     2. Psychiatric:  -  Prolixin 10mg daily.    - VPA 1000mg daily   -PRNs: Haldol 5mg + Lorazepam 2mg PO or IM for non-redirectable agitation   -Individual, group, milieu therapy  -Psychoeducation provided to patient regarding indication for medications, alternatives, side effects, adherence.    3. Medical:  -Patient has been screened and medically cleared for psychiatric admission. will coordinate with hospitalist as needed   -PRN: Tylenol 650mg q6-8hr for moderate pain  -Routine labs    4. Disposition: Pending clinical course; coordinate with team social work    5. Legal status: 9.27
39 yo male with schizoaffective disorder, unstably housed, hx of cannabis use, prior hospitalizations, hx of aggression, no known hx of self-harm, admitted due to psychotic and manic decompensation. 9.39 legal status    ongoing manic symptoms though they appear slightly improved relative to yesterday. patient states he is agreeable to take medications so as long as it supports his discharge. He prefers medications to be given during the day     1. Safety: standard obs     2. Psychiatric:  -  Prolixin 10mg daily.    - VPA 1000mg daily   -PRNs: Haldol 5mg + Lorazepam 2mg PO or IM for non-redirectable agitation   -Individual, group, milieu therapy  -Psychoeducation provided to patient regarding indication for medications, alternatives, side effects, adherence.    3. Medical:  -Patient has been screened and medically cleared for psychiatric admission. will coordinate with hospitalist as needed   -PRN: Tylenol 650mg q6-8hr for moderate pain  -Routine labs    4. Disposition: Pending clinical course; coordinate with team social work    5. Legal status: 9.39
41 yo male with schizoaffective disorder, unstably housed, hx of cannabis use, prior hospitalizations, hx of aggression, no known hx of self-harm, admitted due to psychotic and manic decompensation. 9.39 legal status    pt still exhibiting mood elevations though with relative improvement and pt now more cooperative with interview, medications, going to groups. it's likely that poor adherence to outpatient treatment + substance use likely contributed to current presentation and with attenuating symptoms due to medications + abstinence from substances. aftercare planning in progress; pt has immigration appt on Monday and team hopes to have patient out before then so he can attend and this is pending symptom improvements, behavior on the unit     1. Safety: standard obs     2. Psychiatric:  -  Prolixin 10mg daily.    - VPA 1000mg daily   -PRNs: Haldol 5mg + Lorazepam 2mg PO or IM for non-redirectable agitation   -Individual, group, milieu therapy  -Psychoeducation provided to patient regarding indication for medications, alternatives, side effects, adherence.    3. Medical:  -Patient has been screened and medically cleared for psychiatric admission. will coordinate with hospitalist as needed   -PRN: Tylenol 650mg q6-8hr for moderate pain  -Routine labs    4. Disposition: Pending clinical course; coordinate with team social work    5. Legal status: 9.27

## 2025-04-24 NOTE — BH INPATIENT PSYCHIATRY PROGRESS NOTE - NSBHCHARTREVIEWVS_PSY_A_CORE FT
Vital Signs Last 24 Hrs  T(C): 36.9 (04-24-25 @ 06:39), Max: 36.9 (04-24-25 @ 06:39)  T(F): 98.4 (04-24-25 @ 06:39), Max: 98.4 (04-24-25 @ 06:39)  HR: --  BP: --  BP(mean): --  RR: --  SpO2: 100% (04-23-25 @ 20:57) (100% - 100%)    Orthostatic VS  04-24-25 @ 06:39  Lying BP: --/-- HR: --  Sitting BP: 120/90 HR: 81  Standing BP: 118/81 HR: 90  Site: upper right arm  Mode: electronic  Orthostatic VS  04-23-25 @ 20:57  Lying BP: --/-- HR: --  Sitting BP: 137/81 HR: 88  Standing BP: 129/82 HR: 103  Site: --  Mode: --  Orthostatic VS  04-23-25 @ 06:17  Lying BP: --/-- HR: --  Sitting BP: 136/88 HR: 90  Standing BP: 126/82 HR: 114  Site: --  Mode: --  Orthostatic VS  04-22-25 @ 20:42  Lying BP: --/-- HR: --  Sitting BP: 140/88 HR: 102  Standing BP: 139/87 HR: 101  Site: --  Mode: --  
Vital Signs Last 24 Hrs  T(C): 36.4 (04-22-25 @ 06:58), Max: 36.4 (04-22-25 @ 06:58)  T(F): 97.6 (04-22-25 @ 06:58), Max: 97.6 (04-22-25 @ 06:58)  HR: --  BP: --  BP(mean): --  RR: --  SpO2: --    Orthostatic VS  04-22-25 @ 06:58  Lying BP: --/-- HR: --  Sitting BP: 134/84 HR: 68  Standing BP: 137/89 HR: 67  Site: upper right arm  Mode: electronic  Orthostatic VS  04-21-25 @ 21:02  Lying BP: --/-- HR: --  Sitting BP: 132/97 HR: 108  Standing BP: --/-- HR: --  Site: --  Mode: --  Orthostatic VS  04-21-25 @ 08:19  Lying BP: --/-- HR: --  Sitting BP: 106/63 HR: 84  Standing BP: 111/64 HR: 91  Site: --  Mode: --  Orthostatic VS  04-20-25 @ 20:45  Lying BP: --/-- HR: --  Sitting BP: 118/62 HR: 111  Standing BP: 116/67 HR: 114  Site: --  Mode: --  
Vital Signs Last 24 Hrs  T(C): 36.8 (04-23-25 @ 06:17), Max: 36.8 (04-23-25 @ 06:17)  T(F): 98.2 (04-23-25 @ 06:17), Max: 98.2 (04-23-25 @ 06:17)  HR: --  BP: --  BP(mean): --  RR: --  SpO2: --    Orthostatic VS  04-23-25 @ 06:17  Lying BP: --/-- HR: --  Sitting BP: 136/88 HR: 90  Standing BP: 126/82 HR: 114  Site: --  Mode: --  Orthostatic VS  04-22-25 @ 20:42  Lying BP: --/-- HR: --  Sitting BP: 140/88 HR: 102  Standing BP: 139/87 HR: 101  Site: --  Mode: --  Orthostatic VS  04-22-25 @ 06:58  Lying BP: --/-- HR: --  Sitting BP: 134/84 HR: 68  Standing BP: 137/89 HR: 67  Site: upper right arm  Mode: electronic  Orthostatic VS  04-21-25 @ 21:02  Lying BP: --/-- HR: --  Sitting BP: 132/97 HR: 108  Standing BP: --/-- HR: --  Site: --  Mode: --

## 2025-04-24 NOTE — BH INPATIENT PSYCHIATRY DISCHARGE NOTE - NSDCMRMEDTOKEN_GEN_ALL_CORE_FT
fluPHENAZine 10 mg oral tablet: 1 tab(s) orally once a day continue oral overlap for 12 days  fluPHENAZine decanoate 25 mg/mL injectable solution: 25 milligram(s) intramuscularly every 2 weeks next due 5/6/25  Melatonin 3 mg oral tablet: 1 tab(s) orally once a day (at bedtime)  valproic acid 250 mg/5 mL oral liquid: 20 milliliter(s) orally once a day (at bedtime) total 1000mg nightly

## 2025-04-24 NOTE — BH INPATIENT PSYCHIATRY PROGRESS NOTE - CURRENT MEDICATION
MEDICATIONS  (STANDING):  fluPHENAZine 10 milliGRAM(s) Oral daily  melatonin. 5 milliGRAM(s) Oral at bedtime  valproic  acid Syrup 1000 milliGRAM(s) Oral once    MEDICATIONS  (PRN):  aluminum hydroxide/magnesium hydroxide/simethicone Suspension 30 milliLiter(s) Oral every 6 hours PRN Dyspepsia  famotidine    Tablet 40 milliGRAM(s) Oral every 12 hours PRN reflux  haloperidol     Tablet 5 milliGRAM(s) Oral every 6 hours PRN psychotic agitation  haloperidol    Injectable 5 milliGRAM(s) IntraMuscular once PRN agitation  LORazepam     Tablet 2 milliGRAM(s) Oral every 6 hours PRN agitation  LORazepam   Injectable 2 milliGRAM(s) IntraMuscular once PRN agitation  
MEDICATIONS  (STANDING):  divalproex ER 1000 milliGRAM(s) Oral daily    MEDICATIONS  (PRN):  aluminum hydroxide/magnesium hydroxide/simethicone Suspension 30 milliLiter(s) Oral every 6 hours PRN Dyspepsia  famotidine    Tablet 40 milliGRAM(s) Oral every 12 hours PRN reflux  haloperidol     Tablet 5 milliGRAM(s) Oral every 6 hours PRN psychotic agitation  haloperidol    Injectable 5 milliGRAM(s) IntraMuscular once PRN agitation  LORazepam     Tablet 2 milliGRAM(s) Oral every 6 hours PRN agitation  LORazepam   Injectable 2 milliGRAM(s) IntraMuscular once PRN agitation  
MEDICATIONS  (STANDING):  fluPHENAZine 10 milliGRAM(s) Oral daily  melatonin. 5 milliGRAM(s) Oral at bedtime  valproic  acid Syrup 1000 milliGRAM(s) Oral daily    MEDICATIONS  (PRN):  aluminum hydroxide/magnesium hydroxide/simethicone Suspension 30 milliLiter(s) Oral every 6 hours PRN Dyspepsia  famotidine    Tablet 40 milliGRAM(s) Oral every 12 hours PRN reflux  haloperidol     Tablet 5 milliGRAM(s) Oral every 6 hours PRN psychotic agitation  haloperidol    Injectable 5 milliGRAM(s) IntraMuscular once PRN agitation  LORazepam     Tablet 2 milliGRAM(s) Oral every 6 hours PRN agitation  LORazepam   Injectable 2 milliGRAM(s) IntraMuscular once PRN agitation

## 2025-04-24 NOTE — BH INPATIENT PSYCHIATRY DISCHARGE NOTE - NSBHFUPINTERVALHXFT_PSY_A_CORE
Chart reviewed including pertinent labs, imaging, ekg. case discussed with treatment team.  Over this interval: no behavioral issues. per sleep log, patient slept through the night. he is appropriately engaged, and in following unit rules. he is respectful to staff and peers. he looks forward to discharge and asks for a metrocard. psychoed provided to pt re. medication adherence and he states he will remain compliant upon discharge. denies avh. he reports that he is coordinating with his girlfriend in [place] so that he could go back for the weekend and come down for his immigration hearing on Monday.

## 2025-04-24 NOTE — BH INPATIENT PSYCHIATRY PROGRESS NOTE - PRN MEDS
MEDICATIONS  (PRN):  aluminum hydroxide/magnesium hydroxide/simethicone Suspension 30 milliLiter(s) Oral every 6 hours PRN Dyspepsia  famotidine    Tablet 40 milliGRAM(s) Oral every 12 hours PRN reflux  haloperidol     Tablet 5 milliGRAM(s) Oral every 6 hours PRN psychotic agitation  haloperidol    Injectable 5 milliGRAM(s) IntraMuscular once PRN agitation  LORazepam     Tablet 2 milliGRAM(s) Oral every 6 hours PRN agitation  LORazepam   Injectable 2 milliGRAM(s) IntraMuscular once PRN agitation  

## 2025-04-24 NOTE — BH INPATIENT PSYCHIATRY DISCHARGE NOTE - OTHER PAST PSYCHIATRIC HISTORY (INCLUDE DETAILS REGARDING ONSET, COURSE OF ILLNESS, INPATIENT/OUTPATIENT TREATMENT)
According to ED Behavioral Health Assessment, "Pt is a 41 yo M, no dependents, unstably housed, currently unemployed, past psych hx of schizoaffective d/o, bipolar, multiple hospitalizations last July 2024 at [facility], no known SA, denies medical issues, no known legal, questionable hx of aggression, hx of cannabis/etoh use no hx of withdrawals, presenting to [facility] ED BIB EMS for increasingly bizarre behavior over past month."

## 2025-04-25 VITALS — TEMPERATURE: 98 F

## 2025-04-25 PROCEDURE — 90853 GROUP PSYCHOTHERAPY: CPT

## 2025-04-25 RX ADMIN — Medication 1000 MILLIGRAM(S): at 10:37

## 2025-04-25 RX ADMIN — Medication 10 MILLIGRAM(S): at 10:38

## 2025-04-25 NOTE — BH PSYCHOLOGY - GROUP THERAPY NOTE - NSPSYCHOLGRPCOGINT_PSY_A_CORE FT
Cognitive/behavioral therapy, Acceptance and Commitment therapy, Emotion regulation/coping skills taught, Psychoed
Cognitive/behavioral therapy, Acceptance and Commitment therapy, Emotion regulation/coping skills taught, Psychoed

## 2025-04-25 NOTE — BH PSYCHOLOGY - GROUP THERAPY NOTE - NSBHPSYCHOLRESPCOMMENT_PSY_A_CORE FT
The patient appeared adequately groomed and casually dressed. Pt appeared engaged in group as evidenced by his willingness to verbally communicate during the discussion. Pt was hyperverbal but redirectable. Pt was oriented X3.   The patient appeared adequately groomed and casually dressed. Pt appeared engaged in group as evidenced by his willingness to verbally communicate during the discussion. Pt was oriented X3.

## 2025-04-25 NOTE — BH PSYCHOLOGY - GROUP THERAPY NOTE - NSPSYCHOLGRPCOGPT_PSY_A_CORE FT
Patient attended Cognitive Behavioral Therapy Group incorporating ACT-based concepts. The group began with a brief check-in asking patients to share the best advice they have received. Group then listened to a rain soundtrack as mindfulness practice and were encouraged to notice and allow any emotions, thoughts, images, or memories that arose. Processed mindfulness experience afterwards. Group members discussed difficulty coping with another peer’s recent behavior. Group facilitators processed recent experiences with the group and validated. Group member posed question about whether it is possible to “control” one’s thoughts. Group engaged in discussion about their thoughts and then engaged in thought suppression experiment. Group members determined that it is challenging and often impossible to control one’s thoughts. Discussed concepts of thought suppression and emotional avoidance, as well as alternatives of mindfulness and opening up to uncomfortable experiences. Group discussed concept of acceptance. Group facilitators explained concepts, reinforced participation, and engaged patients in the discussion.

## 2025-04-25 NOTE — BH PSYCHOLOGY - GROUP THERAPY NOTE - TOKEN PULL-DIAGNOSIS
Primary Diagnosis:  Schizoaffective disorder [F25.9]        Problem Dx:   Substance use [F19.90]      Agitation [R45.1]      Psychosis [F29]      Schizoaffective disorder, bipolar type [F25.0]      
Primary Diagnosis:  Schizoaffective disorder [F25.9]        Problem Dx:   Substance use [F19.90]      Agitation [R45.1]      Psychosis [F29]      Schizoaffective disorder, bipolar type [F25.0]

## 2025-05-06 NOTE — SOCIAL WORK POST DISCHARGE FOLLOW UP NOTE - NSBHSWFOLLOWUP_PSY_ALL_CORE_FT
This writer left a message for pt after hearing he did not follow up with aftercare. At time of dc, pt was provided with resources in case of emergency or if pt becomes dysregulated. At time of dc, pt denied SHIIP and AVH. Pt was referred to [facility], which family said was okay despite distance.  This writer left a message for pt after hearing he did not follow up with aftercare. At time of dc, pt was provided with resources in case of emergency or if pt becomes dysregulated. At time of dc, pt denied SHIIP and AVH. Pt was referred to [facility], which family said was okay despite distance. Case was also discussed with treatment team.  This writer left a message for pt after hearing he did not follow up with aftercare. At time of dc, pt was provided with resources in case of emergency or if pt becomes dysregulated. At time of dc, pt denied SHIIP and AVH. Pt was referred to [facility], which family said was okay despite distance. Case was also discussed with treatment team. Case can be closed.